# Patient Record
Sex: FEMALE | Race: WHITE | Employment: OTHER | URBAN - METROPOLITAN AREA
[De-identification: names, ages, dates, MRNs, and addresses within clinical notes are randomized per-mention and may not be internally consistent; named-entity substitution may affect disease eponyms.]

---

## 2024-01-24 ENCOUNTER — OFFICE VISIT (OUTPATIENT)
Dept: PULMONOLOGY | Facility: HOSPITAL | Age: 64
End: 2024-01-24
Payer: COMMERCIAL

## 2024-01-24 VITALS
WEIGHT: 170 LBS | BODY MASS INDEX: 31.09 KG/M2 | DIASTOLIC BLOOD PRESSURE: 66 MMHG | OXYGEN SATURATION: 97 % | HEART RATE: 71 BPM | TEMPERATURE: 96.9 F | SYSTOLIC BLOOD PRESSURE: 124 MMHG

## 2024-01-24 DIAGNOSIS — R91.1 LUNG NODULE: Primary | ICD-10-CM

## 2024-01-24 PROCEDURE — 99213 OFFICE O/P EST LOW 20 MIN: CPT | Performed by: INTERNAL MEDICINE

## 2024-01-24 PROCEDURE — 99213 OFFICE O/P EST LOW 20 MIN: CPT | Mod: GC | Performed by: INTERNAL MEDICINE

## 2024-01-24 RX ORDER — TIOTROPIUM BROMIDE AND OLODATEROL 3.124; 2.736 UG/1; UG/1
2 SPRAY, METERED RESPIRATORY (INHALATION) DAILY
Qty: 4 G | Refills: 3 | Status: SHIPPED | OUTPATIENT
Start: 2024-01-24

## 2024-01-24 SDOH — ECONOMIC STABILITY: FOOD INSECURITY: WITHIN THE PAST 12 MONTHS, YOU WORRIED THAT YOUR FOOD WOULD RUN OUT BEFORE YOU GOT MONEY TO BUY MORE.: NEVER TRUE

## 2024-01-24 SDOH — ECONOMIC STABILITY: FOOD INSECURITY: WITHIN THE PAST 12 MONTHS, THE FOOD YOU BOUGHT JUST DIDN'T LAST AND YOU DIDN'T HAVE MONEY TO GET MORE.: NEVER TRUE

## 2024-01-24 ASSESSMENT — LIFESTYLE VARIABLES
HOW MANY STANDARD DRINKS CONTAINING ALCOHOL DO YOU HAVE ON A TYPICAL DAY: PATIENT DOES NOT DRINK
SKIP TO QUESTIONS 9-10: 1
AUDIT-C TOTAL SCORE: 0
HOW OFTEN DO YOU HAVE A DRINK CONTAINING ALCOHOL: NEVER
HOW OFTEN DO YOU HAVE SIX OR MORE DRINKS ON ONE OCCASION: NEVER

## 2024-01-24 ASSESSMENT — PAIN SCALES - GENERAL: PAINLEVEL: 0-NO PAIN

## 2024-01-24 ASSESSMENT — PATIENT HEALTH QUESTIONNAIRE - PHQ9
2. FEELING DOWN, DEPRESSED OR HOPELESS: NOT AT ALL
1. LITTLE INTEREST OR PLEASURE IN DOING THINGS: NOT AT ALL
SUM OF ALL RESPONSES TO PHQ9 QUESTIONS 1 & 2: 0

## 2024-01-24 NOTE — PROGRESS NOTES
Reason For Visit  Lung Nodule    History Of Present Illness  Comfort Bliss is a 64 y.o. female with PMHx s/f COPD (moderate obstruction on PFTs) who presents to clinic to establish care.     Patient referred after recent imaging showed enlarging nodule. Patient first found to have RLL nodule in 2020, PET obtained at that time which showed minimal avidity so patient was recommended for surveillance imaging. Most recent CT Chest 01/2024 showed interval increase in size of RLL nodule, now measuring [id number] so she was referred to pulmonary clinic.     Patient also endorsing significant worsening of WATKINS, limiting her ability to perform chores around the house. Endorses chronic cough, unproductive. Denies chest pain, LE edema, orthopnea or PND. Denies fevers, chills. Currently on no daily inhalers. Using rescue inhaler 2-3x/day.     Outside PFTs 10/2020 report: moderate obstruction, mild BP response, mild restriction, DLCO low but correct with VA.     CT Chest 01/2024:  Interval increase in the superior segment of the right lower lobe nodule, now   measuring 17 x 10 x 11 mm, previously measuring 12 x 8 x 9 mm.   No new suspicious nodule by size criteria. Redemonstrated are several, punctate   centrilobular nodules primarily throughout the periphery of the right lower   lobe.   Moderate upper lobe predominant centrilobular emphysema.   Curvilinear focus of bandlike atelectasis or scar in the lingula.   No consolidation. No pleural effusion. Central airways are patent.     Echo 11/2020:  Normal LV systolic function  EF 65% without apparent wall motion abnormallities   Doppler pattern consistent with reduced diastolic LV compliance   No valvular disease or pathologic flow abnormalities identified   Normal PA pressure 20 mm Hg   No pericardial effusion     12 point ROS is negative except noted above     Past Medical History  No past medical history on file.    Surgical History  No past surgical history on file.    Family  History  No family history on file.     Social History  Social History     Tobacco Use    Smoking status: Former     Types: Cigarettes     Quit date:      Years since quittin.1   Substance Use Topics    Alcohol use: Not Currently     Comment: stopped 12 yrs ago    Drug use: Never     Allergies  Amoxicillin, Bee venom protein (honey bee), and Penicillins    Vital Signs  Blood pressure 124/66, pulse 71, temperature 36.1 °C (96.9 °F), weight 77.1 kg (170 lb), SpO2 97 %.  Oxygen Therapy  SpO2: 97 %              Physical Exam  Gen: Pleasant, no acute distress  HEENT: no gross abnormalities  CV: RRR  Lungs: CTAB  GI: soft, non tender, non distended  Ext: no LE edema  Neuro: AOx3, motor and sensory grossly intact  Psych: appears appropriate      Relevant Results: as above    Assessment/Plan   Comfort Bliss is a 64 y.o. female with PMHx s/f COPD (moderate obstruction on PFTs) who presents to clinic to follow up on enlarging RLL pulmonary nodule.     #COPD  -appears poorly controlled with increased symptoms recently  -repeat PFTs, 6MWT ordered  -start Stiolto, continue Albuterol PRN    #Enlarging RLL nodule  -given patient extensive smoking, nodule is high risk and will need investigation  -case discussed with IP, plan to schedule patient with biopsy, same day CT Chest at that time for procedural planning  -PET CT ordered    RTC in 3 months    Mariel Nur MD

## 2024-01-24 NOTE — PATIENT INSTRUCTIONS
Thanks for coming in today. Here is what we discussed:  -the fact that the nodule in your lung is growing is concerning given your smoking history and we need to investigate it  -I am ordering you a PET scan, please call to schedule  -I am ordering repeat breathing and a walking tests, please call to schedule these  -I will start you on a new inhaler called Stiolto, use daily and continue to use your rescue inhaler as needed for now  -I will refer you to a our procedure team to schedule a biopsy of this nodule once your PET CT is complete  -return to clinic in 3 months    For scheduling purposes:   Call [phone] to schedule a breathing or a walking test   Call [phone] to schedule  EKG's, Echocardiograms and Cardiopulmonary Stress Tests.   Call [phone] to schedule Radiology tests such as Nuclear Medicine Stress Tests, CT Scans, and MRI's.

## 2024-01-29 DIAGNOSIS — R91.1 LUNG NODULE: Primary | ICD-10-CM

## 2024-01-29 DIAGNOSIS — Z01.818 PRE-OP TESTING: ICD-10-CM

## 2024-01-29 NOTE — PROGRESS NOTES
Bronchoscopy Scheduling Request    Pre-bronchoscopy visit: Not needed   Please schedule procedure: Next available    Cytology on-site:  Yes  Location:  [facility]  Performing physician:  Advanced diagnostic bronchoscopist  Referring physician:  Mariel Nur MD, Richelle Esteves, APRN-CNP  Indication:  RLL nodule  Sedation / Anesthesia:  GA  Procedure:  Navigational bronchoscopy, Staging EBUS  Time:  Tier 3  Fluorscopy:   Yes  Imaging needed:  CT Iván - same day as procedure  Labs:  CBC, BMP  Meds:  None  Special Considerations:  None  Reviewed by:  Austyn Peace MD

## 2024-02-07 ENCOUNTER — HOSPITAL ENCOUNTER (OUTPATIENT)
Dept: RESPIRATORY THERAPY | Facility: HOSPITAL | Age: 64
Discharge: HOME | End: 2024-02-07
Payer: COMMERCIAL

## 2024-02-07 ENCOUNTER — APPOINTMENT (OUTPATIENT)
Dept: RADIOLOGY | Facility: HOSPITAL | Age: 64
End: 2024-02-07
Payer: COMMERCIAL

## 2024-02-07 DIAGNOSIS — R91.1 LUNG NODULE: ICD-10-CM

## 2024-02-07 PROCEDURE — 94726 PLETHYSMOGRAPHY LUNG VOLUMES: CPT | Performed by: INTERNAL MEDICINE

## 2024-02-07 PROCEDURE — 94618 PULMONARY STRESS TESTING: CPT | Performed by: INTERNAL MEDICINE

## 2024-02-07 PROCEDURE — 94726 PLETHYSMOGRAPHY LUNG VOLUMES: CPT

## 2024-02-07 PROCEDURE — 94729 DIFFUSING CAPACITY: CPT | Performed by: INTERNAL MEDICINE

## 2024-02-07 PROCEDURE — 94060 EVALUATION OF WHEEZING: CPT | Performed by: INTERNAL MEDICINE

## 2024-02-08 LAB
MGC ASCENT PFT - FEV1 - POST: 1.14
MGC ASCENT PFT - FEV1 - PRE: 1.21
MGC ASCENT PFT - FEV1 - PREDICTED: 2.14
MGC ASCENT PFT - FVC - POST: 1.55
MGC ASCENT PFT - FVC - PRE: 1.76
MGC ASCENT PFT - FVC - PREDICTED: 2.68

## 2024-02-13 ENCOUNTER — APPOINTMENT (OUTPATIENT)
Dept: RADIOLOGY | Facility: HOSPITAL | Age: 64
End: 2024-02-13
Payer: COMMERCIAL

## 2024-02-20 ENCOUNTER — APPOINTMENT (OUTPATIENT)
Dept: RADIOLOGY | Facility: HOSPITAL | Age: 64
End: 2024-02-20
Payer: COMMERCIAL

## 2024-02-22 ENCOUNTER — LAB (OUTPATIENT)
Dept: LAB | Facility: LAB | Age: 64
End: 2024-02-22
Payer: COMMERCIAL

## 2024-02-22 DIAGNOSIS — R91.1 LUNG NODULE: ICD-10-CM

## 2024-02-22 DIAGNOSIS — Z01.818 PRE-OP TESTING: ICD-10-CM

## 2024-02-22 LAB
ANION GAP SERPL CALC-SCNC: 11 MMOL/L (ref 10–20)
BUN SERPL-MCNC: 12 MG/DL (ref 6–23)
CALCIUM SERPL-MCNC: 9.1 MG/DL (ref 8.6–10.3)
CHLORIDE SERPL-SCNC: 106 MMOL/L (ref 98–107)
CO2 SERPL-SCNC: 25 MMOL/L (ref 21–32)
CREAT SERPL-MCNC: 0.69 MG/DL (ref 0.5–1.05)
EGFRCR SERPLBLD CKD-EPI 2021: >90 ML/MIN/1.73M*2
ERYTHROCYTE [DISTWIDTH] IN BLOOD BY AUTOMATED COUNT: 16.9 % (ref 11.5–14.5)
GLUCOSE SERPL-MCNC: 169 MG/DL (ref 74–99)
HCT VFR BLD AUTO: 33.7 % (ref 36–46)
HGB BLD-MCNC: 10.6 G/DL (ref 12–16)
MCH RBC QN AUTO: 28.5 PG (ref 26–34)
MCHC RBC AUTO-ENTMCNC: 31.5 G/DL (ref 32–36)
MCV RBC AUTO: 91 FL (ref 80–100)
NRBC BLD-RTO: 0 /100 WBCS (ref 0–0)
PLATELET # BLD AUTO: 122 X10*3/UL (ref 150–450)
POTASSIUM SERPL-SCNC: 4 MMOL/L (ref 3.5–5.3)
RBC # BLD AUTO: 3.72 X10*6/UL (ref 4–5.2)
SODIUM SERPL-SCNC: 138 MMOL/L (ref 136–145)
WBC # BLD AUTO: 2.7 X10*3/UL (ref 4.4–11.3)

## 2024-02-22 PROCEDURE — 36415 COLL VENOUS BLD VENIPUNCTURE: CPT

## 2024-02-22 PROCEDURE — 80048 BASIC METABOLIC PNL TOTAL CA: CPT

## 2024-02-22 PROCEDURE — 85027 COMPLETE CBC AUTOMATED: CPT

## 2024-03-07 ENCOUNTER — HOSPITAL ENCOUNTER (OUTPATIENT)
Dept: GASTROENTEROLOGY | Facility: HOSPITAL | Age: 64
Discharge: HOME | End: 2024-03-07
Payer: COMMERCIAL

## 2024-03-07 ENCOUNTER — ANESTHESIA EVENT (OUTPATIENT)
Dept: GASTROENTEROLOGY | Facility: HOSPITAL | Age: 64
End: 2024-03-07
Payer: COMMERCIAL

## 2024-03-07 ENCOUNTER — HOSPITAL ENCOUNTER (OUTPATIENT)
Dept: RADIOLOGY | Facility: HOSPITAL | Age: 64
Discharge: HOME | End: 2024-03-07
Payer: COMMERCIAL

## 2024-03-07 ENCOUNTER — ANESTHESIA (OUTPATIENT)
Dept: GASTROENTEROLOGY | Facility: HOSPITAL | Age: 64
End: 2024-03-07
Payer: COMMERCIAL

## 2024-03-07 VITALS
BODY MASS INDEX: 31.28 KG/M2 | DIASTOLIC BLOOD PRESSURE: 74 MMHG | WEIGHT: 170 LBS | TEMPERATURE: 96.8 F | HEART RATE: 81 BPM | OXYGEN SATURATION: 96 % | RESPIRATION RATE: 21 BRPM | HEIGHT: 62 IN | SYSTOLIC BLOOD PRESSURE: 130 MMHG

## 2024-03-07 DIAGNOSIS — Z01.818 PRE-OP TESTING: ICD-10-CM

## 2024-03-07 DIAGNOSIS — R91.1 LUNG NODULE: ICD-10-CM

## 2024-03-07 LAB — GLUCOSE BLD MANUAL STRIP-MCNC: 113 MG/DL (ref 74–99)

## 2024-03-07 PROCEDURE — 3700000001 HC GENERAL ANESTHESIA TIME - INITIAL BASE CHARGE

## 2024-03-07 PROCEDURE — 31627 NAVIGATIONAL BRONCHOSCOPY: CPT | Performed by: INTERNAL MEDICINE

## 2024-03-07 PROCEDURE — 31628 BRONCHOSCOPY/LUNG BX EACH: CPT | Performed by: INTERNAL MEDICINE

## 2024-03-07 PROCEDURE — 88305 TISSUE EXAM BY PATHOLOGIST: CPT | Mod: TC,SUR | Performed by: INTERNAL MEDICINE

## 2024-03-07 PROCEDURE — 88172 CYTP DX EVAL FNA 1ST EA SITE: CPT | Performed by: PATHOLOGY

## 2024-03-07 PROCEDURE — 71250 CT THORAX DX C-: CPT

## 2024-03-07 PROCEDURE — 88305 TISSUE EXAM BY PATHOLOGIST: CPT | Performed by: STUDENT IN AN ORGANIZED HEALTH CARE EDUCATION/TRAINING PROGRAM

## 2024-03-07 PROCEDURE — 7100000010 HC PHASE TWO TIME - EACH INCREMENTAL 1 MINUTE

## 2024-03-07 PROCEDURE — 88305 TISSUE EXAM BY PATHOLOGIST: CPT | Performed by: PATHOLOGY

## 2024-03-07 PROCEDURE — 31629 BRONCHOSCOPY/NEEDLE BX EACH: CPT | Performed by: INTERNAL MEDICINE

## 2024-03-07 PROCEDURE — 2500000005 HC RX 250 GENERAL PHARMACY W/O HCPCS: Mod: SE

## 2024-03-07 PROCEDURE — 88173 CYTOPATH EVAL FNA REPORT: CPT | Mod: TC,MCY | Performed by: INTERNAL MEDICINE

## 2024-03-07 PROCEDURE — 2500000002 HC RX 250 W HCPCS SELF ADMINISTERED DRUGS (ALT 637 FOR MEDICARE OP, ALT 636 FOR OP/ED): Mod: SE | Performed by: STUDENT IN AN ORGANIZED HEALTH CARE EDUCATION/TRAINING PROGRAM

## 2024-03-07 PROCEDURE — 88341 IMHCHEM/IMCYTCHM EA ADD ANTB: CPT | Performed by: STUDENT IN AN ORGANIZED HEALTH CARE EDUCATION/TRAINING PROGRAM

## 2024-03-07 PROCEDURE — 7100000009 HC PHASE TWO TIME - INITIAL BASE CHARGE

## 2024-03-07 PROCEDURE — 2500000005 HC RX 250 GENERAL PHARMACY W/O HCPCS: Mod: SE | Performed by: STUDENT IN AN ORGANIZED HEALTH CARE EDUCATION/TRAINING PROGRAM

## 2024-03-07 PROCEDURE — 3700000002 HC GENERAL ANESTHESIA TIME - EACH INCREMENTAL 1 MINUTE

## 2024-03-07 PROCEDURE — 71250 CT THORAX DX C-: CPT | Performed by: RADIOLOGY

## 2024-03-07 PROCEDURE — 2500000004 HC RX 250 GENERAL PHARMACY W/ HCPCS (ALT 636 FOR OP/ED): Mod: SE

## 2024-03-07 PROCEDURE — 31654 BRONCH EBUS IVNTJ PERPH LES: CPT | Performed by: INTERNAL MEDICINE

## 2024-03-07 PROCEDURE — A31654 PR BRNSCHSC TNDSC EBUS DX/TX INTERVENTION PERPH LES: Performed by: STUDENT IN AN ORGANIZED HEALTH CARE EDUCATION/TRAINING PROGRAM

## 2024-03-07 PROCEDURE — 88342 IMHCHEM/IMCYTCHM 1ST ANTB: CPT | Performed by: STUDENT IN AN ORGANIZED HEALTH CARE EDUCATION/TRAINING PROGRAM

## 2024-03-07 PROCEDURE — 88173 CYTOPATH EVAL FNA REPORT: CPT | Performed by: PATHOLOGY

## 2024-03-07 PROCEDURE — 2720000007 HC OR 272 NO HCPCS

## 2024-03-07 PROCEDURE — 7100000002 HC RECOVERY ROOM TIME - EACH INCREMENTAL 1 MINUTE

## 2024-03-07 PROCEDURE — 7100000001 HC RECOVERY ROOM TIME - INITIAL BASE CHARGE

## 2024-03-07 PROCEDURE — 82947 ASSAY GLUCOSE BLOOD QUANT: CPT

## 2024-03-07 PROCEDURE — A31654 PR BRNSCHSC TNDSC EBUS DX/TX INTERVENTION PERPH LES

## 2024-03-07 RX ORDER — HYDROMORPHONE HYDROCHLORIDE 1 MG/ML
0.2 INJECTION, SOLUTION INTRAMUSCULAR; INTRAVENOUS; SUBCUTANEOUS EVERY 5 MIN PRN
Status: CANCELLED | OUTPATIENT
Start: 2024-03-07

## 2024-03-07 RX ORDER — ONDANSETRON HYDROCHLORIDE 2 MG/ML
4 INJECTION, SOLUTION INTRAVENOUS ONCE AS NEEDED
Status: CANCELLED | OUTPATIENT
Start: 2024-03-07

## 2024-03-07 RX ORDER — OXYCODONE HYDROCHLORIDE 5 MG/1
2.5 TABLET ORAL EVERY 4 HOURS PRN
Status: CANCELLED | OUTPATIENT
Start: 2024-03-07

## 2024-03-07 RX ORDER — PROPOFOL 10 MG/ML
INJECTION, EMULSION INTRAVENOUS AS NEEDED
Status: DISCONTINUED | OUTPATIENT
Start: 2024-03-07 | End: 2024-03-07

## 2024-03-07 RX ORDER — ESMOLOL HYDROCHLORIDE 10 MG/ML
INJECTION INTRAVENOUS AS NEEDED
Status: DISCONTINUED | OUTPATIENT
Start: 2024-03-07 | End: 2024-03-07

## 2024-03-07 RX ORDER — METOCLOPRAMIDE HYDROCHLORIDE 5 MG/ML
10 INJECTION INTRAMUSCULAR; INTRAVENOUS ONCE AS NEEDED
Status: CANCELLED | OUTPATIENT
Start: 2024-03-07

## 2024-03-07 RX ORDER — ACETAMINOPHEN 325 MG/1
650 TABLET ORAL EVERY 4 HOURS PRN
Status: CANCELLED | OUTPATIENT
Start: 2024-03-07

## 2024-03-07 RX ORDER — ROCURONIUM BROMIDE 10 MG/ML
INJECTION, SOLUTION INTRAVENOUS AS NEEDED
Status: DISCONTINUED | OUTPATIENT
Start: 2024-03-07 | End: 2024-03-07

## 2024-03-07 RX ORDER — LABETALOL HYDROCHLORIDE 5 MG/ML
5 INJECTION, SOLUTION INTRAVENOUS ONCE AS NEEDED
Status: CANCELLED | OUTPATIENT
Start: 2024-03-07

## 2024-03-07 RX ORDER — DEXAMETHASONE SODIUM PHOSPHATE 4 MG/ML
INJECTION, SOLUTION INTRA-ARTICULAR; INTRALESIONAL; INTRAMUSCULAR; INTRAVENOUS; SOFT TISSUE AS NEEDED
Status: DISCONTINUED | OUTPATIENT
Start: 2024-03-07 | End: 2024-03-07

## 2024-03-07 RX ORDER — PROPOFOL 10 MG/ML
INJECTION, EMULSION INTRAVENOUS CONTINUOUS PRN
Status: DISCONTINUED | OUTPATIENT
Start: 2024-03-07 | End: 2024-03-07

## 2024-03-07 RX ORDER — ALBUTEROL SULFATE 0.83 MG/ML
2.5 SOLUTION RESPIRATORY (INHALATION) ONCE AS NEEDED
Status: CANCELLED | OUTPATIENT
Start: 2024-03-07

## 2024-03-07 RX ORDER — SODIUM CHLORIDE, SODIUM LACTATE, POTASSIUM CHLORIDE, CALCIUM CHLORIDE 600; 310; 30; 20 MG/100ML; MG/100ML; MG/100ML; MG/100ML
INJECTION, SOLUTION INTRAVENOUS CONTINUOUS PRN
Status: DISCONTINUED | OUTPATIENT
Start: 2024-03-07 | End: 2024-03-07

## 2024-03-07 RX ORDER — ONDANSETRON HYDROCHLORIDE 2 MG/ML
INJECTION, SOLUTION INTRAVENOUS AS NEEDED
Status: DISCONTINUED | OUTPATIENT
Start: 2024-03-07 | End: 2024-03-07

## 2024-03-07 RX ORDER — OXYCODONE HYDROCHLORIDE 5 MG/1
5 TABLET ORAL EVERY 4 HOURS PRN
Status: CANCELLED | OUTPATIENT
Start: 2024-03-07

## 2024-03-07 RX ORDER — LIDOCAINE HYDROCHLORIDE 10 MG/ML
0.1 INJECTION INFILTRATION; PERINEURAL ONCE
Status: CANCELLED | OUTPATIENT
Start: 2024-03-07 | End: 2024-03-07

## 2024-03-07 RX ORDER — MIDAZOLAM HYDROCHLORIDE 1 MG/ML
INJECTION INTRAMUSCULAR; INTRAVENOUS AS NEEDED
Status: DISCONTINUED | OUTPATIENT
Start: 2024-03-07 | End: 2024-03-07

## 2024-03-07 RX ORDER — ALBUTEROL SULFATE 0.83 MG/ML
2.5 SOLUTION RESPIRATORY (INHALATION) EVERY 6 HOURS PRN
Status: DISCONTINUED | OUTPATIENT
Start: 2024-03-07 | End: 2024-03-08 | Stop reason: HOSPADM

## 2024-03-07 RX ORDER — HYDRALAZINE HYDROCHLORIDE 20 MG/ML
5 INJECTION INTRAMUSCULAR; INTRAVENOUS EVERY 30 MIN PRN
Status: CANCELLED | OUTPATIENT
Start: 2024-03-07

## 2024-03-07 RX ORDER — SODIUM CHLORIDE, SODIUM LACTATE, POTASSIUM CHLORIDE, CALCIUM CHLORIDE 600; 310; 30; 20 MG/100ML; MG/100ML; MG/100ML; MG/100ML
100 INJECTION, SOLUTION INTRAVENOUS CONTINUOUS
Status: CANCELLED | OUTPATIENT
Start: 2024-03-07

## 2024-03-07 RX ORDER — LIDOCAINE HCL/PF 100 MG/5ML
SYRINGE (ML) INTRAVENOUS AS NEEDED
Status: DISCONTINUED | OUTPATIENT
Start: 2024-03-07 | End: 2024-03-07

## 2024-03-07 RX ADMIN — ESMOLOL HYDROCHLORIDE 40 MG: 10 INJECTION, SOLUTION INTRAVENOUS at 12:42

## 2024-03-07 RX ADMIN — SODIUM CHLORIDE, POTASSIUM CHLORIDE, SODIUM LACTATE AND CALCIUM CHLORIDE: 600; 310; 30; 20 INJECTION, SOLUTION INTRAVENOUS at 11:18

## 2024-03-07 RX ADMIN — ROCURONIUM BROMIDE 60 MG: 10 INJECTION INTRAVENOUS at 11:20

## 2024-03-07 RX ADMIN — LIDOCAINE HYDROCHLORIDE 70 MG: 20 INJECTION INTRAVENOUS at 11:20

## 2024-03-07 RX ADMIN — ONDANSETRON 4 MG: 2 INJECTION INTRAMUSCULAR; INTRAVENOUS at 12:48

## 2024-03-07 RX ADMIN — MIDAZOLAM HYDROCHLORIDE 2 MG: 1 INJECTION, SOLUTION INTRAMUSCULAR; INTRAVENOUS at 11:18

## 2024-03-07 RX ADMIN — ALBUTEROL SULFATE 2.5 MG: 2.5 SOLUTION RESPIRATORY (INHALATION) at 09:27

## 2024-03-07 RX ADMIN — ESMOLOL HYDROCHLORIDE 20 MG: 10 INJECTION, SOLUTION INTRAVENOUS at 12:10

## 2024-03-07 RX ADMIN — DEXAMETHASONE SODIUM PHOSPHATE 10 MG: 4 INJECTION, SOLUTION INTRA-ARTICULAR; INTRALESIONAL; INTRAMUSCULAR; INTRAVENOUS; SOFT TISSUE at 11:25

## 2024-03-07 RX ADMIN — PROPOFOL 200 MG: 10 INJECTION, EMULSION INTRAVENOUS at 11:20

## 2024-03-07 RX ADMIN — ROCURONIUM BROMIDE 20 MG: 10 INJECTION INTRAVENOUS at 12:19

## 2024-03-07 RX ADMIN — PROPOFOL 100 MCG/KG/MIN: 10 INJECTION, EMULSION INTRAVENOUS at 11:26

## 2024-03-07 SDOH — HEALTH STABILITY: MENTAL HEALTH: CURRENT SMOKER: 0

## 2024-03-07 ASSESSMENT — PAIN - FUNCTIONAL ASSESSMENT
PAIN_FUNCTIONAL_ASSESSMENT: 0-10
PAIN_FUNCTIONAL_ASSESSMENT: 0-10

## 2024-03-07 ASSESSMENT — PAIN SCALES - GENERAL
PAINLEVEL_OUTOF10: 0 - NO PAIN

## 2024-03-07 ASSESSMENT — COLUMBIA-SUICIDE SEVERITY RATING SCALE - C-SSRS
1. IN THE PAST MONTH, HAVE YOU WISHED YOU WERE DEAD OR WISHED YOU COULD GO TO SLEEP AND NOT WAKE UP?: NO
2. HAVE YOU ACTUALLY HAD ANY THOUGHTS OF KILLING YOURSELF?: NO
6. HAVE YOU EVER DONE ANYTHING, STARTED TO DO ANYTHING, OR PREPARED TO DO ANYTHING TO END YOUR LIFE?: NO

## 2024-03-07 NOTE — DISCHARGE INSTRUCTIONS
The anesthetics, sedatives or narcotics which were given to you today will be acting in your body for the next 24 hours, so you might feel a little sleepy or groggy. This feeling should slowly wear off.   Carefully read and follow the instructions below:   You received sedation today.   Do not drive or operate machinery or power tools of any kind.   No alcoholic beverages today, not even beer or wine.   No over the counter medications that contain alcohol or may cause drowsiness.   Do not make important decisions or sign legal documents.     Do not use Aspirin containing products or non-steroidal medications for the next 24 hours.  (Examples of these types of medications include: Advil, Aleve, Ecotrin, Ibuprofen, Motrin or Naprosyn.  This list is not all-inclusive.  Check with your physician or pharmacist before resuming these medications.   Tylenol, cough medicine, cough drops or throat lozenges may be used when you are allowed to resume eating and drinking.     Call your physician if any of these symptoms occur:   High fever over 101 degrees or chills (a low grade fever is common for 24 hours)   Rash or hives   Persistent nausea or vomiting   Inability to urinate within 8 hours after the procedure    Go directly to the emergency room if you notice any of the following:   Shortness of breath   Chest pain              Coughing up large amounts of bright red blood greater than a teaspoonful of blood clots (about a teaspoonful for the next 24-48 hours is normal, especially if you had a biopsy)    Resume all normal medications unless directed otherwise by your doctor.     Your doctor recommends these additional instructions:    Follow up with your referring physician as previously scheduled.    If you experience any problems or have any questions following discharge, please call:   Before 5 pm: [phone]   After 5pm and on weekends: [phone] / [phone] and ask for the Pulmonary Fellow on-call (Pager  Number: [phone])

## 2024-03-07 NOTE — ANESTHESIA POSTPROCEDURE EVALUATION
Patient: Comfort Bliss    Procedure Summary       Date: 03/07/24 Room / Location: [facility]    Anesthesia Start: 1113 Anesthesia Stop: 1308    Procedure: BRONCHOSCOPY Diagnosis: Lung nodule    Scheduled Providers: Mukul Najera MD; Sara Watson RN; Shiraz Aguirre MD Responsible Provider: JOSETTE Nicole    Anesthesia Type: general ASA Status: 3            Anesthesia Type: general    Vitals Value Taken Time   /92 03/07/24 1333   Temp 36 °C (96.8 °F) 03/07/24 1306   Pulse 80 03/07/24 1333   Resp 21 03/07/24 1333   SpO2 95 % 03/07/24 1333       Anesthesia Post Evaluation    Patient location during evaluation: PACU  Patient participation: complete - patient participated  Level of consciousness: awake and alert  Pain management: adequate  Airway patency: patent  Cardiovascular status: acceptable  Respiratory status: acceptable  Hydration status: acceptable  Postoperative Nausea and Vomiting: none        There were no known notable events for this encounter.

## 2024-03-07 NOTE — H&P
"History Of Present Illness  Comfort Bliss is a 64 y.o. female presenting with imaging findings showing a right lower lobe nodule. She has a dry cough and dyspnea on exertion. Co-morbidities as below.     Past Medical History  Past Medical History:   Diagnosis Date    COPD (chronic obstructive pulmonary disease) (CMS/[facility])        Surgical History  History reviewed. No pertinent surgical history.     Social History  She reports that she quit smoking about 4 years ago. Her smoking use included cigarettes. She does not have any smokeless tobacco history on file. She reports that she does not currently use alcohol. She reports that she does not use drugs.    Family History  No family history on file.     Allergies  Amoxicillin, Bee venom protein (honey bee), and Penicillins    Review of Systems   As above  Physical Exam  HENT:      Head: Normocephalic and atraumatic.      Nose: No rhinorrhea.   Eyes:      General:         Right eye: No discharge.         Left eye: No discharge.   Cardiovascular:      Rate and Rhythm: Normal rate.   Pulmonary:      Effort: Pulmonary effort is normal.   Skin:     Coloration: Skin is not jaundiced.   Neurological:      Mental Status: She is alert and oriented to person, place, and time.   Psychiatric:         Mood and Affect: Mood normal.          Last Recorded Vitals  Blood pressure 142/82, pulse 83, temperature 36.1 °C (97 °F), temperature source Temporal, resp. rate 20, height 1.575 m (5' 2\"), weight 77.1 kg (170 lb), SpO2 97 %.    Relevant Results             Assessment/Plan   Active Problems:  There are no active Hospital Problems.      Right lower lobe nodule - proceed with diagnostic bronchoscopy.       I spent 10 minutes in the professional and overall care of this patient.      Mukul Najera MD  Staff Physician - Interventional Pulmonology  11:09 AM  03/07/24      "

## 2024-03-07 NOTE — ANESTHESIA PREPROCEDURE EVALUATION
"Patient: Comfort Bliss    Procedure Information       Date/Time: 24 1030    Scheduled providers: Mukul Najera MD; Sara Watson RN; Shiraz Aguirre MD    Procedure: BRONCHOSCOPY    Location: [facility]          64 yoF h/o asthma, COPD, T2DM, GERD, rheumatic fever, HLD, hypothyroidism, panic attacks presenting for bronchoscopy after RLL nodule found on CT chest and staging EBUS.     ALLERGIES:  Allergies   Allergen Reactions    Amoxicillin Shortness of breath and Dizziness    Bee Venom Protein (Honey Bee) Swelling    Penicillins Unknown        MEDICAL HISTORY:  No past medical history on file.     Relevant Problems   No relevant active problems        SURGICAL HISTORY:  No past surgical history on file.     VITALS:      2024     1:59 PM 3/3/2021     4:05 PM   Vitals   Systolic 124 139   Diastolic 66 78   Heart Rate 71 81   Temp 36.1 °C (96.9 °F) 36.3 °C (97.3 °F)   Resp  16   Height (in)  1.575 m (5' 2\")   Weight (lb) 170 189.25   BMI 31.09 kg/m2 34.61 kg/m2   BSA (m2) 1.84 m2 1.94 m2   Visit Report Report        LABS:   BMP   Lab Results   Component Value Date    GLUCOSE 169 (H) 2024    CALCIUM 9.1 2024     2024    K 4.0 2024    CO2 25 2024     2024    BUN 12 2024    CREATININE 0.69 2024   , CBC  Lab Results   Component Value Date    WBC 2.7 (L) 2024    HGB 10.6 (L) 2024    HCT 33.7 (L) 2024    MCV 91 2024     (L) 2024      SOCIAL:  Social History     Tobacco Use   Smoking Status Former    Types: Cigarettes    Quit date: 2020    Years since quittin.1   Smokeless Tobacco Not on file      Social History     Substance and Sexual Activity   Alcohol Use Not Currently    Comment: stopped 12 yrs ago      Social History     Substance and Sexual Activity   Drug Use Never        NPO STATUS:  No data recorded    Clinical Areas Reviewed:    Allergies              Physical " Exam    Airway  Mallampati: IV  TM distance: <3 FB  Neck ROM: full     Cardiovascular   Rhythm: regular  Rate: normal     Dental   Comments: edentulous   Pulmonary   Comments: Non labored resp   Abdominal            Anesthesia Plan    History of general anesthesia?: yes  History of complications of general anesthesia?: no    ASA 3     general     The patient is not a current smoker.    intravenous induction   Postoperative administration of opioids is intended.  Trial extubation is planned.  Anesthetic plan and risks discussed with patient.  Use of blood products discussed with patient who consented to blood products.    Plan discussed with CRNA and CAA.

## 2024-03-07 NOTE — ANESTHESIA PROCEDURE NOTES
Airway  Date/Time: 3/7/2024 11:22 AM  Urgency: elective    Airway not difficult    Staffing  Performed: CRNA   Authorized by: Shiraz Aguirre MD    Performed by: MARCIAL Nicole-YUE  Patient location during procedure: OR    Indications and Patient Condition  Indications for airway management: anesthesia and airway protection  Spontaneous ventilation: present  Sedation level: no sedation  Preoxygenated: yes  Patient position: sniffing  MILS maintained throughout  Mask difficulty assessment: 1 - vent by mask    Final Airway Details  Final airway type: endotracheal airway      Successful airway: ETT  Cuffed: yes   Successful intubation technique: video laryngoscopy  Facilitating devices/methods: intubating stylet  Blade: Mely  Blade size: #4  ETT size (mm): 8.5  Cormack-Lehane Classification: grade IIa - partial view of glottis  Placement verified by: chest auscultation and capnometry   Measured from: lips  ETT to lips (cm): 19  Number of attempts at approach: 1

## 2024-03-08 LAB
LABORATORY COMMENT REPORT: NORMAL
LABORATORY COMMENT REPORT: NORMAL
PATH REPORT.FINAL DX SPEC: NORMAL
PATH REPORT.GROSS SPEC: NORMAL
PATH REPORT.INTRAOP OBS SPEC DOC: NORMAL
PATH REPORT.TOTAL CANCER: NORMAL

## 2024-03-12 LAB
LAB AP ASR DISCLAIMER: NORMAL
LABORATORY COMMENT REPORT: NORMAL
PATH REPORT.COMMENTS IMP SPEC: NORMAL
PATH REPORT.FINAL DX SPEC: NORMAL
PATH REPORT.GROSS SPEC: NORMAL
PATH REPORT.TOTAL CANCER: NORMAL
RESIDENT REVIEW: NORMAL

## 2024-03-13 ENCOUNTER — TELEPHONE (OUTPATIENT)
Dept: PULMONOLOGY | Facility: CLINIC | Age: 64
End: 2024-03-13
Payer: COMMERCIAL

## 2024-03-13 DIAGNOSIS — D3A.090 CARCINOID TUMOR DETERMINED BY BIOPSY OF LUNG (CMS-HCC): Primary | ICD-10-CM

## 2024-03-13 NOTE — TELEPHONE ENCOUNTER
Result Communication    Resulted Orders   Cytology Consultation (Non-Gynecologic)   Result Value Ref Range    Case Report       Non-gynecologic Cytology                          Case: [id number]                                   Authorizing Provider:  Mukul Najera MD     Collected:           03/07/2024 1158              Ordering Location:     [facility]       Received:            03/07/2024 1642                                     Center                                                                       Pathologist:           Pili Han MD                                                          Specimen:    LUNG FINE NEEDLE ASPIRATION RIGHT LOWER LOBE, RLL nodule                                   Final Cytological Interpretation       A. LUNG, FINE NEEDLE ASPIRATION, RIGHT LOWER LOBE, CYTOLOGY AND CELL BLOCK:  --  Non diagnostic.  Specimen consists of rare benign bronchial cells.  --  Correlate with concurrent biopsy surgical pathology report ([id number]).               Slide(s) initially screened by AYSHA Guerrero at [address] [id number]  By the signature on this report, the individual or group listed as making the Final Interpretation/Diagnosis certifies that they have reviewed this case.       Rapid Onsite Evaluation       A. LUNG FINE NEEDLE ASPIRATION RIGHT LOWER LOBE.  Rapid On-site Evaluation Read Result:  Pass 1:  Rare bronchial cells present.    Pathologist: Dr. Yolande Hallman  Called Date/Time: 3/7/2024 12:50 PM    Evaluation performed at:   [facility]  Department of Pathology  [address] [place] [id number]  Phone: [phone] Fax: [phone]        Specimen Description       A. LUNG FINE NEEDLE ASPIRATION RIGHT LOWER LOBE.  Received 6 direct smears (3 air-dried Diff-Quik and 3 spray-fixed) and 48 ml red opaque needle rinse in Cytolyt with particles.       Specimen  Processing Detail       A1 Slides Only (No Block)   A1-1 Diff Quik Stain Smear NGYN    A1-2 Pap Stain Smear NGYN    A1-3 Diff Quik Stain Smear NGYN    A1-4 Pap Stain Smear NGYN    A1-5 Diff Quik Stain Smear NGYN    A1-6 Pap Stain Smear NGYN    A2 Cell Block   A2-1 H&E      Surgical Pathology Exam   Result Value Ref Range    Case Report       Surgical Pathology                                Case: [id number]                                  Authorizing Provider:  Mukul Najera MD     Collected:           03/07/2024 1226              Ordering Location:     [facility]       Received:            03/07/2024 1901                                     Center                                                                       Pathologist:           Faustino Egan MD                                                              Specimen:    TRANSBRONCHIAL BIOPSY, RLL nodule TBBX cryoprobe                                           FINAL DIAGNOSIS       A. LUNG, RIGHT LOWER LOBE; BIOPSY:    -- Carcinoid tumor. See comment              By the signature on this report, the individual or group listed as making the Final Interpretation/Diagnosis certifies that they have reviewed this case.       Comment       No increased mitoses or necrosis seen in this material. If this sample is representative of the entire tumor, these findings would be consistent with typical carcinoid. However, final distinction between typical and atypical carcinoid tumor requires a resection specimen.     The tumor cells are positive for synaptophysin, chromogranin, INSM1, TTF1 immunostain, while they are negative for p40 and CK AE1/AE3. Ki67 proliferative index is low (2%), and Rb immunostain shows retained nuclear expression.       Resident Review       The gross and/or microscopic findings were reviewed in conjunction with pathology resident, Victoria Chinchilla MD.       Gross Description       A: Received in formalin, labeled with the  patient´s name and hospital number, are multiple minute pink-white, soft tissue fragments aggregating to 0.3 x 0.3 x 0.1 cm.  The specimen is submitted in toto in one cassette.  [facility]      Disclaimer       One or more of the reagents used to perform assays on this specimen MAY have contained components considered to be analyte specific reagents (ASR's).  ASR's have not been cleared or approved by the U.S. Food and Drug Administration.  These assays were developed and their performance characteristics determined by the Department of Pathology at [facility]. The FDA does not require this test to go through premarket FDA review. This test is used for clinical purposes. It should not be regarded as investigational or for research. This laboratory is certified under the Clinical Laboratory Improvement Amendments (CLIA) as qualified to perform high complexity clinical laboratory testing.  The assays were performed with appropriate positive and negative controls which stained appropriately.         12:57 PM      Results were successfully communicated with the patient and they acknowledged their understanding.    Referral to thoracic surgery for consideration of resection

## 2024-04-01 PROBLEM — N39.0 ACUTE UTI: Status: ACTIVE | Noted: 2020-07-28

## 2024-04-01 PROBLEM — N89.8 VAGINAL IRRITATION: Status: ACTIVE | Noted: 2021-01-13

## 2024-04-01 PROBLEM — E11.9 DIABETES (MULTI): Status: ACTIVE | Noted: 2020-09-15

## 2024-04-01 PROBLEM — J43.2 CENTRILOBULAR EMPHYSEMA (MULTI): Status: ACTIVE | Noted: 2020-12-02

## 2024-04-01 PROBLEM — E66.811 OBESITY (BMI 30.0-34.9): Status: ACTIVE | Noted: 2020-09-15

## 2024-04-01 PROBLEM — E66.9 OBESITY (BMI 30.0-34.9): Status: ACTIVE | Noted: 2020-09-15

## 2024-04-01 PROBLEM — U07.1 COVID-19: Status: ACTIVE | Noted: 2021-09-15

## 2024-04-01 PROBLEM — R94.8 ABNORMAL GASTROINTESTINAL PET SCAN: Status: ACTIVE | Noted: 2020-11-23

## 2024-04-01 PROBLEM — R91.1 SOLITARY PULMONARY NODULE: Status: ACTIVE | Noted: 2024-01-29

## 2024-04-01 PROBLEM — S80.10XA: Status: ACTIVE | Noted: 2020-12-21

## 2024-04-01 PROBLEM — R91.8 OTHER NONSPECIFIC ABNORMAL FINDING OF LUNG FIELD: Status: ACTIVE | Noted: 2021-03-03

## 2024-04-01 PROBLEM — K21.9 GASTROESOPHAGEAL REFLUX DISEASE WITHOUT ESOPHAGITIS: Status: ACTIVE | Noted: 2020-07-28

## 2024-04-01 PROBLEM — R91.8 ABNORMAL FINDINGS ON DIAGNOSTIC IMAGING OF LUNG: Status: ACTIVE | Noted: 2024-04-01

## 2024-04-01 PROBLEM — J44.9 COPD (CHRONIC OBSTRUCTIVE PULMONARY DISEASE) (MULTI): Status: ACTIVE | Noted: 2020-12-02

## 2024-04-01 PROBLEM — R91.1 LUNG NODULE: Status: ACTIVE | Noted: 2020-12-02

## 2024-04-01 PROBLEM — R91.8 ABNORMAL CT SCAN OF LUNG: Status: ACTIVE | Noted: 2024-04-01

## 2024-04-01 PROBLEM — R42 VERTIGO: Status: ACTIVE | Noted: 2022-12-20

## 2024-04-01 PROBLEM — R35.89 OTHER POLYURIA: Status: ACTIVE | Noted: 2023-12-26

## 2024-04-01 PROBLEM — I10 ESSENTIAL (PRIMARY) HYPERTENSION: Status: ACTIVE | Noted: 2023-12-26

## 2024-04-01 PROBLEM — N64.9 BREAST LESION: Status: ACTIVE | Noted: 2021-07-23

## 2024-04-01 RX ORDER — OXYBUTYNIN CHLORIDE 10 MG/1
10 TABLET, EXTENDED RELEASE ORAL DAILY
COMMUNITY
Start: 2024-03-13

## 2024-04-01 RX ORDER — LEVOTHYROXINE SODIUM 25 UG/1
25 TABLET ORAL DAILY
COMMUNITY

## 2024-04-01 RX ORDER — MELOXICAM 100 %
POWDER (GRAM) MISCELLANEOUS
COMMUNITY
Start: 2024-01-17 | End: 2024-04-10 | Stop reason: ALTCHOICE

## 2024-04-01 RX ORDER — AMANTADINE HCL
POWDER (GRAM) MISCELLANEOUS
COMMUNITY
Start: 2023-06-23 | End: 2024-04-29 | Stop reason: WASHOUT

## 2024-04-01 RX ORDER — METFORMIN HYDROCHLORIDE 500 MG/1
1000 TABLET, EXTENDED RELEASE ORAL EVERY MORNING
COMMUNITY
Start: 2022-10-16

## 2024-04-01 RX ORDER — CELECOXIB 400 MG/1
400 CAPSULE ORAL DAILY
Status: ON HOLD | COMMUNITY
Start: 2023-12-14 | End: 2024-05-03 | Stop reason: WASHOUT

## 2024-04-01 RX ORDER — CHOLECALCIFEROL (VITAMIN D3) 125 MCG
125 CAPSULE ORAL DAILY
COMMUNITY

## 2024-04-01 RX ORDER — BLOOD SUGAR DIAGNOSTIC
STRIP MISCELLANEOUS
COMMUNITY
Start: 2024-01-03

## 2024-04-01 RX ORDER — OMEPRAZOLE 20 MG/1
20 CAPSULE, DELAYED RELEASE ORAL 2 TIMES DAILY
COMMUNITY

## 2024-04-01 RX ORDER — LISINOPRIL 5 MG/1
5 TABLET ORAL
COMMUNITY
Start: 2024-01-03

## 2024-04-01 RX ORDER — SITAGLIPTIN 100 MG/1
100 TABLET, FILM COATED ORAL DAILY
COMMUNITY

## 2024-04-01 RX ORDER — ALBUTEROL SULFATE 90 UG/1
2 AEROSOL, METERED RESPIRATORY (INHALATION) EVERY 4 HOURS PRN
COMMUNITY
Start: 2021-03-03 | End: 2024-04-02 | Stop reason: WASHOUT

## 2024-04-01 RX ORDER — LANCETS 33 GAUGE
EACH MISCELLANEOUS
COMMUNITY
Start: 2024-01-02

## 2024-04-01 RX ORDER — LANCETS 30 GAUGE
EACH MISCELLANEOUS
COMMUNITY
Start: 2024-01-03

## 2024-04-01 NOTE — PROGRESS NOTES
Subjective   Comfort Bliss  is a 64 y.o. female who presents for evaluation of newly diagnosed right lower lobe carcinoid cancer.    This patient received radiographic imaging in the setting of COPD.  This identified a pulmonary nodule.  January 2024, a right lower lobe nodule grew from 12 x 9 mm to 17 x 10 mm.  She was referred for bronchoscopy procedure which was performed on 3/7/2024 and the right lower lobe biopsy was consistent with carcinoid tumor (Ki-67 proliferative index is low 2%)    Currently the patient is in their usual state of health. She denies the following symptoms: chest pain, shortness of breath at rest, shortness of breath with activity, cough, hemoptysis, and fevers.      Medical history is notable for no history of MI, CVA or other major cardiovascular disease. She has no history of prior chest surgery.     She  reports that she quit smoking about 4 years ago. Her smoking use included cigarettes. She does not have any smokeless tobacco history on file. She reports that she does not currently use alcohol. She reports that she does not use drugs.    Objective   Physical Exam  The patient is well-appearing and in no acute distress. The trachea is midline and there is no crepitus. The lungs were clear to auscultation grossly. There was good effort and excursion. The heart had a regular rate and rhythm. The abdomen was soft, nontender and nondistended. The extremities had no edema or gross deformities. Mood and affect are appropriate.  Diagnostic Studies  I have reviewed a pathology result which shows carcinoid tumor  I reviewed pulmonary function test dated 2/7/2024 which show an FEV1 of 56% predicted and DLCO of 73% predicted  === 03/07/24 ===    CT CHEST WITHOUT FOR [facility] PLANNING    - Impression -  1. No evidence of acute pathology.  2. Indeterminate slightly lobulated nodule at the superior segment of  the right lower lobe, unchanged in size compared to 01/04/2024 exam,  however increased  in size compared to 09/29/2021. Malignancy can not  be completely excluded. Consider correlation with tissue sampling.  3. Several additional stable pulmonary nodules, with representative  example as above.  4. Diffuse hypoattenuation of visualized liver, suggestive of  steatosis. Correlate with liver function tests.  5. Splenomegaly, partially imaged.        PET/CT is ordered  Assessment/Plan   Overall, I believe that the patient  has a new diagnosis of localized carcinoid tumor .     This patient appears to have a localized tumor in the superior segment of the right lower lobe.  I believe the ideal treatment would be surgical resection with a superior segmental resection.  We discussed the surgery in detail. I carefully described the risks, benefits, and alternatives to surgical intervention. We discussed the possibility of serious complications including death. I answered any questions they expressed. After this, the patient agreed to proceed with surgery    I recommend PET CT and surgery.  Robotic right lower lobe superior segmental resection. (The PET was denied and may not be re-ordered?) Patient prefers that surgery be at [facility].      I discussed this in detail with the patient, including a discussion of alternatives. They were comfortable with this approach.     Sameer Tate MD  [phone]

## 2024-04-01 NOTE — H&P (VIEW-ONLY)
Subjective   Comfort Bliss  is a 64 y.o. female who presents for evaluation of newly diagnosed right lower lobe carcinoid cancer.    This patient received radiographic imaging in the setting of COPD.  This identified a pulmonary nodule.  January 2024, a right lower lobe nodule grew from 12 x 9 mm to 17 x 10 mm.  She was referred for bronchoscopy procedure which was performed on 3/7/2024 and the right lower lobe biopsy was consistent with carcinoid tumor (Ki-67 proliferative index is low 2%)    Currently the patient is in their usual state of health. She denies the following symptoms: chest pain, shortness of breath at rest, shortness of breath with activity, cough, hemoptysis, and fevers.      Medical history is notable for no history of MI, CVA or other major cardiovascular disease. She has no history of prior chest surgery.     She  reports that she quit smoking about 4 years ago. Her smoking use included cigarettes. She does not have any smokeless tobacco history on file. She reports that she does not currently use alcohol. She reports that she does not use drugs.    Objective   Physical Exam  The patient is well-appearing and in no acute distress. The trachea is midline and there is no crepitus. The lungs were clear to auscultation grossly. There was good effort and excursion. The heart had a regular rate and rhythm. The abdomen was soft, nontender and nondistended. The extremities had no edema or gross deformities. Mood and affect are appropriate.  Diagnostic Studies  I have reviewed a pathology result which shows carcinoid tumor  I reviewed pulmonary function test dated 2/7/2024 which show an FEV1 of 56% predicted and DLCO of 73% predicted  === 03/07/24 ===    CT CHEST WITHOUT FOR [facility] PLANNING    - Impression -  1. No evidence of acute pathology.  2. Indeterminate slightly lobulated nodule at the superior segment of  the right lower lobe, unchanged in size compared to 01/04/2024 exam,  however increased  in size compared to 09/29/2021. Malignancy can not  be completely excluded. Consider correlation with tissue sampling.  3. Several additional stable pulmonary nodules, with representative  example as above.  4. Diffuse hypoattenuation of visualized liver, suggestive of  steatosis. Correlate with liver function tests.  5. Splenomegaly, partially imaged.        PET/CT is ordered  Assessment/Plan   Overall, I believe that the patient  has a new diagnosis of localized carcinoid tumor .     This patient appears to have a localized tumor in the superior segment of the right lower lobe.  I believe the ideal treatment would be surgical resection with a superior segmental resection.  We discussed the surgery in detail. I carefully described the risks, benefits, and alternatives to surgical intervention. We discussed the possibility of serious complications including death. I answered any questions they expressed. After this, the patient agreed to proceed with surgery    I recommend PET CT and surgery.  Robotic right lower lobe superior segmental resection. (The PET was denied and may not be re-ordered?) Patient prefers that surgery be at [facility].      I discussed this in detail with the patient, including a discussion of alternatives. They were comfortable with this approach.     Sameer Tate MD  [phone]

## 2024-04-10 ENCOUNTER — OFFICE VISIT (OUTPATIENT)
Dept: SURGERY | Facility: CLINIC | Age: 64
End: 2024-04-10
Payer: COMMERCIAL

## 2024-04-10 VITALS
WEIGHT: 169.4 LBS | DIASTOLIC BLOOD PRESSURE: 79 MMHG | OXYGEN SATURATION: 98 % | HEIGHT: 62 IN | HEART RATE: 76 BPM | TEMPERATURE: 98.3 F | SYSTOLIC BLOOD PRESSURE: 129 MMHG | BODY MASS INDEX: 31.17 KG/M2

## 2024-04-10 DIAGNOSIS — D3A.090 CARCINOID TUMOR DETERMINED BY BIOPSY OF LUNG (CMS-HCC): Primary | ICD-10-CM

## 2024-04-10 PROCEDURE — 99205 OFFICE O/P NEW HI 60 MIN: CPT | Performed by: THORACIC SURGERY (CARDIOTHORACIC VASCULAR SURGERY)

## 2024-04-10 PROCEDURE — 3074F SYST BP LT 130 MM HG: CPT | Performed by: THORACIC SURGERY (CARDIOTHORACIC VASCULAR SURGERY)

## 2024-04-10 PROCEDURE — 4010F ACE/ARB THERAPY RXD/TAKEN: CPT | Performed by: THORACIC SURGERY (CARDIOTHORACIC VASCULAR SURGERY)

## 2024-04-10 PROCEDURE — 3078F DIAST BP <80 MM HG: CPT | Performed by: THORACIC SURGERY (CARDIOTHORACIC VASCULAR SURGERY)

## 2024-04-10 PROCEDURE — 99215 OFFICE O/P EST HI 40 MIN: CPT | Mod: 57 | Performed by: THORACIC SURGERY (CARDIOTHORACIC VASCULAR SURGERY)

## 2024-04-10 RX ORDER — HEPARIN SODIUM 5000 [USP'U]/ML
5000 INJECTION, SOLUTION INTRAVENOUS; SUBCUTANEOUS ONCE
Status: CANCELLED | OUTPATIENT
Start: 2024-04-10 | End: 2024-04-10

## 2024-04-10 RX ORDER — GABAPENTIN 100 MG/1
300 CAPSULE ORAL ONCE
Status: CANCELLED | OUTPATIENT
Start: 2024-04-10 | End: 2024-04-10

## 2024-04-10 RX ORDER — ACETAMINOPHEN 325 MG/1
650 TABLET ORAL ONCE
Status: CANCELLED | OUTPATIENT
Start: 2024-04-10 | End: 2024-04-10

## 2024-04-10 ASSESSMENT — ENCOUNTER SYMPTOMS
OCCASIONAL FEELINGS OF UNSTEADINESS: 1
LOSS OF SENSATION IN FEET: 1
DEPRESSION: 0

## 2024-04-10 ASSESSMENT — PAIN SCALES - GENERAL: PAINLEVEL: 0-NO PAIN

## 2024-04-12 DIAGNOSIS — C34.31 MALIGNANT NEOPLASM OF LOWER LOBE OF RIGHT LUNG (MULTI): Primary | ICD-10-CM

## 2024-04-16 PROBLEM — D3A.090: Status: ACTIVE | Noted: 2024-04-10

## 2024-04-24 ENCOUNTER — PRE-ADMISSION TESTING (OUTPATIENT)
Dept: PREADMISSION TESTING | Facility: HOSPITAL | Age: 64
End: 2024-04-24
Payer: COMMERCIAL

## 2024-04-24 ENCOUNTER — HOSPITAL ENCOUNTER (OUTPATIENT)
Dept: CARDIOLOGY | Facility: HOSPITAL | Age: 64
Discharge: HOME | End: 2024-04-24
Payer: COMMERCIAL

## 2024-04-24 ENCOUNTER — OFFICE VISIT (OUTPATIENT)
Dept: PULMONOLOGY | Facility: HOSPITAL | Age: 64
End: 2024-04-24
Payer: COMMERCIAL

## 2024-04-24 VITALS
BODY MASS INDEX: 30.73 KG/M2 | SYSTOLIC BLOOD PRESSURE: 102 MMHG | DIASTOLIC BLOOD PRESSURE: 66 MMHG | HEART RATE: 70 BPM | TEMPERATURE: 97.9 F | OXYGEN SATURATION: 97 % | WEIGHT: 168 LBS

## 2024-04-24 VITALS
TEMPERATURE: 97.2 F | HEART RATE: 73 BPM | OXYGEN SATURATION: 98 % | HEIGHT: 62 IN | DIASTOLIC BLOOD PRESSURE: 79 MMHG | BODY MASS INDEX: 30.99 KG/M2 | SYSTOLIC BLOOD PRESSURE: 128 MMHG | WEIGHT: 168.4 LBS

## 2024-04-24 DIAGNOSIS — J44.9 CHRONIC OBSTRUCTIVE PULMONARY DISEASE, UNSPECIFIED COPD TYPE (MULTI): Primary | ICD-10-CM

## 2024-04-24 DIAGNOSIS — R91.1 LUNG NODULE: ICD-10-CM

## 2024-04-24 DIAGNOSIS — Z01.818 PREOPERATIVE EXAMINATION: ICD-10-CM

## 2024-04-24 DIAGNOSIS — Z01.818 PREOPERATIVE EXAMINATION: Primary | ICD-10-CM

## 2024-04-24 DIAGNOSIS — I10 HYPERTENSION, UNSPECIFIED TYPE: ICD-10-CM

## 2024-04-24 DIAGNOSIS — D3A.090 CARCINOID TUMOR DETERMINED BY BIOPSY OF LUNG (CMS-HCC): ICD-10-CM

## 2024-04-24 LAB
ABO GROUP (TYPE) IN BLOOD: NORMAL
ANION GAP SERPL CALC-SCNC: 15 MMOL/L (ref 10–20)
ANTIBODY SCREEN: NORMAL
BUN SERPL-MCNC: 10 MG/DL (ref 6–23)
CALCIUM SERPL-MCNC: 9.7 MG/DL (ref 8.6–10.6)
CHLORIDE SERPL-SCNC: 102 MMOL/L (ref 98–107)
CO2 SERPL-SCNC: 25 MMOL/L (ref 21–32)
CREAT SERPL-MCNC: 0.73 MG/DL (ref 0.5–1.05)
EGFRCR SERPLBLD CKD-EPI 2021: >90 ML/MIN/1.73M*2
ERYTHROCYTE [DISTWIDTH] IN BLOOD BY AUTOMATED COUNT: 14.9 % (ref 11.5–14.5)
GLUCOSE SERPL-MCNC: 92 MG/DL (ref 74–99)
HCT VFR BLD AUTO: 38.2 % (ref 36–46)
HGB BLD-MCNC: 11.8 G/DL (ref 12–16)
MCH RBC QN AUTO: 28.5 PG (ref 26–34)
MCHC RBC AUTO-ENTMCNC: 30.9 G/DL (ref 32–36)
MCV RBC AUTO: 92 FL (ref 80–100)
NRBC BLD-RTO: 0 /100 WBCS (ref 0–0)
PLATELET # BLD AUTO: 146 X10*3/UL (ref 150–450)
POTASSIUM SERPL-SCNC: 4.3 MMOL/L (ref 3.5–5.3)
RBC # BLD AUTO: 4.14 X10*6/UL (ref 4–5.2)
RH FACTOR (ANTIGEN D): NORMAL
SODIUM SERPL-SCNC: 138 MMOL/L (ref 136–145)
WBC # BLD AUTO: 3.7 X10*3/UL (ref 4.4–11.3)

## 2024-04-24 PROCEDURE — 86901 BLOOD TYPING SEROLOGIC RH(D): CPT

## 2024-04-24 PROCEDURE — 36415 COLL VENOUS BLD VENIPUNCTURE: CPT

## 2024-04-24 PROCEDURE — 99213 OFFICE O/P EST LOW 20 MIN: CPT | Performed by: INTERNAL MEDICINE

## 2024-04-24 PROCEDURE — 99204 OFFICE O/P NEW MOD 45 MIN: CPT | Performed by: NURSE PRACTITIONER

## 2024-04-24 PROCEDURE — 99213 OFFICE O/P EST LOW 20 MIN: CPT | Mod: GC | Performed by: INTERNAL MEDICINE

## 2024-04-24 PROCEDURE — 87081 CULTURE SCREEN ONLY: CPT

## 2024-04-24 PROCEDURE — 80048 BASIC METABOLIC PNL TOTAL CA: CPT

## 2024-04-24 PROCEDURE — 3074F SYST BP LT 130 MM HG: CPT | Performed by: INTERNAL MEDICINE

## 2024-04-24 PROCEDURE — 93005 ELECTROCARDIOGRAM TRACING: CPT

## 2024-04-24 PROCEDURE — 3078F DIAST BP <80 MM HG: CPT | Performed by: INTERNAL MEDICINE

## 2024-04-24 PROCEDURE — 4010F ACE/ARB THERAPY RXD/TAKEN: CPT | Performed by: INTERNAL MEDICINE

## 2024-04-24 PROCEDURE — 93010 ELECTROCARDIOGRAM REPORT: CPT | Performed by: INTERNAL MEDICINE

## 2024-04-24 PROCEDURE — 85027 COMPLETE CBC AUTOMATED: CPT

## 2024-04-24 RX ORDER — ALBUTEROL SULFATE 0.83 MG/ML
2.5 SOLUTION RESPIRATORY (INHALATION) 4 TIMES DAILY PRN
Qty: 360 ML | Refills: 2 | Status: SHIPPED | OUTPATIENT
Start: 2024-04-24 | End: 2025-04-24

## 2024-04-24 RX ORDER — CHLORHEXIDINE GLUCONATE ORAL RINSE 1.2 MG/ML
SOLUTION DENTAL
Qty: 473 ML | Refills: 0 | Status: SHIPPED | OUTPATIENT
Start: 2024-04-24 | End: 2024-05-04 | Stop reason: HOSPADM

## 2024-04-24 RX ORDER — CHLORHEXIDINE GLUCONATE 40 MG/ML
SOLUTION TOPICAL 2 TIMES DAILY
Qty: 473 ML | Refills: 0 | Status: SHIPPED | OUTPATIENT
Start: 2024-04-24 | End: 2024-05-04 | Stop reason: HOSPADM

## 2024-04-24 RX ORDER — MELOXICAM 100 %
POWDER (GRAM) MISCELLANEOUS
COMMUNITY
Start: 2024-04-12 | End: 2024-04-29 | Stop reason: WASHOUT

## 2024-04-24 SDOH — ECONOMIC STABILITY: FOOD INSECURITY: WITHIN THE PAST 12 MONTHS, YOU WORRIED THAT YOUR FOOD WOULD RUN OUT BEFORE YOU GOT MONEY TO BUY MORE.: NEVER TRUE

## 2024-04-24 SDOH — ECONOMIC STABILITY: FOOD INSECURITY: WITHIN THE PAST 12 MONTHS, THE FOOD YOU BOUGHT JUST DIDN'T LAST AND YOU DIDN'T HAVE MONEY TO GET MORE.: NEVER TRUE

## 2024-04-24 ASSESSMENT — DUKE ACTIVITY SCORE INDEX (DASI)
TOTAL_SCORE: 41.2
CAN YOU WALK A BLOCK OR TWO ON LEVEL GROUND: YES
CAN YOU TAKE CARE OF YOURSELF (EAT, DRESS, BATHE, OR USE TOILET): YES
CAN YOU DO LIGHT WORK AROUND THE HOUSE LIKE DUSTING OR WASHING DISHES: YES
CAN YOU CLIMB A FLIGHT OF STAIRS OR WALK UP A HILL: YES
CAN YOU HAVE SEXUAL RELATIONS: YES
CAN YOU TAKE CARE OF YOURSELF (EAT, DRESS, BATHE, OR USE TOILET): YES
CAN YOU RUN A SHORT DISTANCE: YES
CAN YOU HAVE SEXUAL RELATIONS: YES
CAN YOU WALK INDOORS, SUCH AS AROUND YOUR HOUSE: YES
CAN YOU WALK A BLOCK OR TWO ON LEVEL GROUND: YES
CAN YOU RUN A SHORT DISTANCE: YES
CAN YOU DO YARD WORK LIKE RAKING LEAVES, WEEDING OR PUSHING A MOWER: YES
CAN YOU DO MODERATE WORK AROUND THE HOUSE LIKE VACUUMING, SWEEPING FLOORS OR CARRYING GROCERIES: NO
CAN YOU DO LIGHT WORK AROUND THE HOUSE LIKE DUSTING OR WASHING DISHES: YES
CAN YOU WALK INDOORS, SUCH AS AROUND YOUR HOUSE: YES
CAN YOU PARTICIPATE IN MODERATE RECREATIONAL ACTIVITIES LIKE GOLF, BOWLING, DANCING, DOUBLES TENNIS OR THROWING A BASEBALL OR FOOTBALL: NO
CAN YOU PARTICIPATE IN MODERATE RECREATIONAL ACTIVITIES LIKE GOLF, BOWLING, DANCING, DOUBLES TENNIS OR THROWING A BASEBALL OR FOOTBALL: NO
CAN YOU PARTICIPATE IN STRENOUS SPORTS LIKE SWIMMING, SINGLES TENNIS, FOOTBALL, BASKETBALL, OR SKIING: NO
DASI METS SCORE: 7.8
CAN YOU DO HEAVY WORK AROUND THE HOUSE LIKE SCRUBBING FLOORS OR LIFTING AND MOVING HEAVY FURNITURE: YES
CAN YOU DO MODERATE WORK AROUND THE HOUSE LIKE VACUUMING, SWEEPING FLOORS OR CARRYING GROCERIES: NO
CAN YOU PARTICIPATE IN STRENOUS SPORTS LIKE SWIMMING, SINGLES TENNIS, FOOTBALL, BASKETBALL, OR SKIING: NO
CAN YOU DO YARD WORK LIKE RAKING LEAVES, WEEDING OR PUSHING A MOWER: YES
CAN YOU CLIMB A FLIGHT OF STAIRS OR WALK UP A HILL: YES

## 2024-04-24 ASSESSMENT — LIFESTYLE VARIABLES
SKIP TO QUESTIONS 9-10: 1
HOW OFTEN DO YOU HAVE SIX OR MORE DRINKS ON ONE OCCASION: NEVER
HOW MANY STANDARD DRINKS CONTAINING ALCOHOL DO YOU HAVE ON A TYPICAL DAY: PATIENT DOES NOT DRINK
HOW OFTEN DO YOU HAVE A DRINK CONTAINING ALCOHOL: NEVER
SMOKING_STATUS: NONSMOKER
AUDIT-C TOTAL SCORE: 0

## 2024-04-24 ASSESSMENT — PATIENT HEALTH QUESTIONNAIRE - PHQ9
SUM OF ALL RESPONSES TO PHQ9 QUESTIONS 1 & 2: 0
1. LITTLE INTEREST OR PLEASURE IN DOING THINGS: NOT AT ALL
2. FEELING DOWN, DEPRESSED OR HOPELESS: NOT AT ALL

## 2024-04-24 ASSESSMENT — PAIN SCALES - GENERAL: PAINLEVEL: 0-NO PAIN

## 2024-04-24 ASSESSMENT — ENCOUNTER SYMPTOMS
CARDIOVASCULAR NEGATIVE: 1
NEUROLOGICAL NEGATIVE: 1
RESPIRATORY NEGATIVE: 1
CONSTITUTIONAL NEGATIVE: 1
DYSPNEA WITH EXERTION: 1
NECK NEGATIVE: 1
MUSCULOSKELETAL NEGATIVE: 1
EYES NEGATIVE: 1
ENDOCRINE NEGATIVE: 1
GASTROINTESTINAL NEGATIVE: 1

## 2024-04-24 NOTE — CPM/PAT H&P
CPM/PAT Evaluation       Name: Comfort Bliss (Comfort Bliss)  /Age: 1960/64 y.o.     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  The patient is referred by Dr. Sameer Tate preoperative evaluation of right lower lobe carcinoid tumor scheduled for surgery, COPD, NIDDM, GERD, thyroid nodule under surveillance, hypertension, hypothyroidism, nephrolithiasis, overactive bladder, peripheral neuropathy.    Past Medical History:   Diagnosis Date    Adverse effect of anesthesia     Resp distress, dyspnea    Cancer of lower lobe of right lung (Multi)     right lower lobe carcinoid cancer.    COPD (chronic obstructive pulmonary disease) (Multi)     Diabetes mellitus (Multi)     GERD (gastroesophageal reflux disease)     Hx of thyroid nodule     6 mm right thyroid nodule, Noted on US in     Hypertension     Managed by PCP    Hypothyroidism     Juvenile rheumatic fever     Lung nodule     right lower lobe nodule    Nephrolithiasis     OAB (overactive bladder)     Peripheral neuropathy     Shortness of breath     Vertigo     no recent episodes, last occurence over a year ago    Vision loss     Wears glasses       Past Surgical History:   Procedure Laterality Date    BREAST BIOPSY      BRONCHOSCOPY      COLONOSCOPY      CT CHEST WITHOUT FOR [facility] PLANNING  2024    IMPRESSION: 1. No evidence of acute pathology. 2. Indeterminate slightly lobulated nodule at the superior segment of the right lower lobe, unchanged in size compared to 2024 exam however increased in size compared to 2021 Malignancy can not be completely excluded Consider correlation with tissue sampling3 Several additional stable pulmonary nodules, with representative example as above    SALPINGECTOMY      US THYROID   10/05/2020    IMPRESSION:   Atrophic thyroid gland consistent with known hypothyroidism   6 mm right thyroid nodule       Patient  has no history on file for sexual activity.    Family History   Problem Relation Name Age of Onset    Rheumatic fever Mother      Breast cancer Sister      Diabetes Maternal Grandmother      Rheumatic fever Maternal Grandmother         Allergies   Allergen Reactions    Amoxicillin Shortness of breath and Dizziness    Bee Venom Protein (Honey Bee) Swelling    Penicillins Shortness of breath and Dizziness    Pravastatin Other and Myalgia       Prior to Admission medications    Medication Sig Start Date End Date Taking? Authorizing Provider   amantadine HCl, bulk, powder  6/23/23  Yes Historical Provider, MD   Januvia 100 mg tablet Take 1 tablet (100 mg) by mouth once daily.   Yes Historical Provider, MD   levothyroxine (Synthroid, Levoxyl) 25 mcg tablet Take 1 tablet (25 mcg) by mouth once daily.   Yes Historical Provider, MD   lisinopril 5 mg tablet Take 1 tablet (5 mg) by mouth once daily. 1/3/24  Yes Historical Provider, MD   metFORMIN  mg 24 hr tablet Take 1 tablet (500 mg) by mouth 3 times a day. 10/16/22  Yes Historical Provider, MD   omeprazole (PriLOSEC) 20 mg DR capsule Take 1 capsule (20 mg) by mouth 2 times a day.   Yes Historical Provider, MD   oxybutynin XL (Ditropan-XL) 10 mg 24 hr tablet  3/13/24  Yes Historical Provider, MD   tiotropium-olodateroL (Stiolto Respimat) 2.5-2.5 mcg/actuation mist inhaler Inhale 2 Inhalations once daily. 1/24/24  Yes Mariel Nur MD   albuterol 2.5 mg /3 mL (0.083 %) nebulizer solution Take 3 mL (2.5 mg) by nebulization 4 times a day as needed for wheezing or shortness of breath. 4/24/24 4/24/25  Mariel Nur MD   ALBUTEROL INHL Inhale.    Historical Provider, MD   celecoxib (CeleBREX) 400 mg capsule Take 1 capsule (400 mg) by mouth once daily. 12/14/23   Historical Provider, MD   chlorhexidine (Hibiclens) 4 % external liquid  Apply topically 2 times a day for 5 days. 4/24/24 4/29/24  Samantha A Meeson, APRN-CNP   chlorhexidine (Peridex) 0.12 % solution Swish and spit 15 mL night before surgery and morning of surgery 4/24/24   Samantha A Meeson, APRN-CNP   cholecalciferol (Vitamin D-3) 125 MCG (5000 UT) capsule Take 1 capsule (125 mcg) by mouth once daily.    Historical Provider, MD   meloxicam, bulk, 100 % powder  4/12/24   Historical Provider, MD   OneTouch Delica Plus Lancet 33 gauge misc use 1 LANCET to TEST BLOOD SUGAR twice a day 1/2/24   Historical Provider, MD   OneTouch Ultra Test strip use 1 TEST STRIP to TEST BLOOD SUGAR twice a day as directed 1/3/24   Historical Provider, MD   OneTouch Ultra2 Meter misc use as directed twice a day 1/3/24   Historical Provider, MD IZAGUIRRE ROS:   Constitutional:   neg    Neuro/Psych:   neg    Eyes:   neg     use of corrective lenses  Ears:   neg    Nose:   neg    Mouth:   neg    Throat:   neg    Neck:   neg    Cardio:   neg     WATKINS  Respiratory:   neg    Endocrine:   neg    GI:   neg    :   neg    Musculoskeletal:   neg    Hematologic:   neg    Skin:  neg        Physical Exam  Vitals reviewed.   Constitutional:       Appearance: Normal appearance.   HENT:      Head: Normocephalic.      Mouth/Throat:      Mouth: Mucous membranes are moist.   Eyes:      Conjunctiva/sclera: Conjunctivae normal.   Neck:      Vascular: No carotid bruit.   Cardiovascular:      Rate and Rhythm: Normal rate and regular rhythm.      Pulses: Normal pulses.      Heart sounds: Normal heart sounds.   Pulmonary:      Effort: Pulmonary effort is normal.      Breath sounds: Normal breath sounds.   Abdominal:      Palpations: Abdomen is soft.      Tenderness: There is no abdominal tenderness.   Musculoskeletal:         General: Normal range of motion.      Cervical back: Normal range of motion.      Right lower leg: No edema.      Left lower leg: No edema.   Lymphadenopathy:      Cervical: No cervical adenopathy.   Skin:      General: Skin is warm and dry.      Capillary Refill: Capillary refill takes less than 2 seconds.   Neurological:      General: No focal deficit present.      Mental Status: She is alert and oriented to person, place, and time.   Psychiatric:         Mood and Affect: Mood normal.         Behavior: Behavior normal.         Thought Content: Thought content normal.         Judgment: Judgment normal.          PAT AIRWAY:   Airway:     Mallampati::  III    Neck ROM::  Full   upper dentures      Visit Vitals  /79   Pulse 73   Temp 36.2 °C (97.2 °F)       DASI Risk Score      Flowsheet Row Most Recent Value   DASI SCORE 41.2   METS Score (Will be calculated only when all the questions are answered) 7.8          Caprini DVT Assessment      Flowsheet Row Most Recent Value   DVT Score 13   Current Status COPD, Major surgery planned, lasting over 3 hours, Present cancer or chemotherapy   History Prior major surgery, COPD   Age 60-75 years   BMI 30 or less          Modified Frailty Index      Flowsheet Row Most Recent Value   Modified Frailty Index Calculator .2727          CHADS2 Stroke Risk  Current as of 22 minutes ago        N/A 3 to 100%: High Risk   2 to < 3%: Medium Risk   0 to < 2%: Low Risk     Last Change: N/A          This score determines the patient's risk of having a stroke if the patient has atrial fibrillation.        This score is not applicable to this patient. Components are not calculated.          Revised Cardiac Risk Index      Flowsheet Row Most Recent Value   Revised Cardiac Risk Calculator 1          Apfel Simplified Score      Flowsheet Row Most Recent Value   Apfel Simplified Score Calculator 3          Risk Analysis Index Results This Encounter    No data found in the last 1 encounters.       Stop Bang Score      Flowsheet Row Most Recent Value   Do you snore loudly? 1   Do you often feel tired or fatigued after your sleep? 1   Has anyone ever observed you stop breathing in your sleep? 1   Do  you have or are you being treated for high blood pressure? 1   Recent BMI (Calculated) 31   Is BMI greater than 35 kg/m2? 0=No   Age older than 50 years old? 1=Yes   Is your neck circumference greater than 17 inches (Male) or 16 inches (Female)? 0   Gender - Male 0=No   STOP-BANG Total Score 5            Assessment and Plan:   Neuro:  peripheral neuropathy managed not currently on medications.    The patient is at an increased risk for post operative delirium secondary to type and duration of surgery.  Preoperative brain exercise educational handout provided to patient.    The patient is at an increased risk for perioperative stroke secondary to HTN, DM , female sex , general anesthesia, and op time >2.5 hours.     HEENT/Airway:  No diagnosis or significant findings on chart review or clinical presentation and evaluation.     Cardiovascular:  hypertension managed on lisinopril (hold 24 hours). EKG in office today NSR. No additional preoperative testing is currently indicated.    METS are 7.8    RCRI  1 which is 6%  30 day risk of MACE (risk for cardiac death, nonfatal myocardial infarction, and nonfactal cardiac arrest    SONIYA score which indicates a 0.4% risk of intraoperative or 30-day postoperative MACE      Pulmonary:  right lower lobe carcinoid tumor scheduled for surgery, COPD (moderate obstruction on PFTs) well-controlled with no recent exacerbations. Using rescue inhaler once a week which has improved in the last two months since addition of stiolto (continue). Recommend prioritizing non-opioid analgesic techniques (regional and local anesthesia, nonsteroidal medications, etc) before the administration of opioids and close monitoring for hypoventilation after surgery due to suspected MARCE. If intravenous narcotics are needed beyond the immediate uriel-operative period, the patient may benefit from continuous pulse oximetry to monitor for hypoxic events till baseline Sp02 is normal on room air and a respiratory  "therapy evaluation. Patient currently being worked up for sleep apnea.  History of \"respiratory distress\" following colonoscopy and prior tubal pregnancy surgery thought to be due to anxiety and untreated sleep apnea- consider extubating to CPAP. Both episodes resolved once patient was completely awake from anesthesia. Follows with pulmonology, Dr. Mariel Nur- last visit 24- referred for thoracic surgery for lung nodule positive for carcinoid. Preoperative deep breathing educational handout provided to patient.    ARISCAT:  50    points which is a high (42.1%) risk of in-hospital post-op pulmonary complications     PRODIGY:  13  points which is a intermediate risk of post op opioid induced respiratory depression episodes    STOP BAN   points which is a high risk for moderate to severe MARCE    Renal: nephrolithiasis with no current s/s or concerns.  overactive bladder managed on oxybutynin (hold 24 hours).    The patient is at increased risk of perioperative renal complications secondary to age>/= 56, HTN, and diabetes. Preventative measures include preoperative BP control and holding ACE 24 hours.    Endocrine:  NIDDM managed on januvia (hold 24 hours) and meformin (hold 24 hours). A1c on 24 7.3%. Thyroid nodule under surveillance, and hypothyroidism managed on levothyroxine (continue), patient asymptomatic- TSH 24 WNL at 2.46. Both followed by MARCIAL Esteves at [facility] - last visit 24.    Hematologic:   No diagnosis or significant findings on chart review or clinical presentation and evaluation however see below risk screening tool.  Preoperative DVT educational handout provided to patient.    Caprini Score:  13  points which is a highest risk of perioperative VTE    Gastrointestinal:   GERD managed on omeprazole (continue).    EAT-10 score of  0- self-perceived oropharyngeal dysphagia scale (0-40)     Apfel: 3 points 61%% risk for post operative " N/V    Infectious disease:  No diagnosis or significant findings on chart review or clinical presentation and evaluation.      Musculoskeletal:  No diagnosis or significant findings on chart review or clinical presentation and evaluation.       Labs ordered  Recent Results (from the past 168 hour(s))   Type And Screen    Collection Time: 04/24/24  3:50 PM   Result Value Ref Range    ABO TYPE O     Rh TYPE POS     ANTIBODY SCREEN NEG    CBC    Collection Time: 04/24/24  3:50 PM   Result Value Ref Range    WBC 3.7 (L) 4.4 - 11.3 x10*3/uL    nRBC 0.0 0.0 - 0.0 /100 WBCs    RBC 4.14 4.00 - 5.20 x10*6/uL    Hemoglobin 11.8 (L) 12.0 - 16.0 g/dL    Hematocrit 38.2 36.0 - 46.0 %    MCV 92 80 - 100 fL    MCH 28.5 26.0 - 34.0 pg    MCHC 30.9 (L) 32.0 - 36.0 g/dL    RDW 14.9 (H) 11.5 - 14.5 %    Platelets 146 (L) 150 - 450 x10*3/uL   Basic Metabolic Panel    Collection Time: 04/24/24  3:50 PM   Result Value Ref Range    Glucose 92 74 - 99 mg/dL    Sodium 138 136 - 145 mmol/L    Potassium 4.3 3.5 - 5.3 mmol/L    Chloride 102 98 - 107 mmol/L    Bicarbonate 25 21 - 32 mmol/L    Anion Gap 15 10 - 20 mmol/L    Urea Nitrogen 10 6 - 23 mg/dL    Creatinine 0.73 0.50 - 1.05 mg/dL    eGFR >90 >60 mL/min/1.73m*2    Calcium 9.7 8.6 - 10.6 mg/dL   Staphylococcus aureus/MRSA colonization, Culture    Collection Time: 04/24/24  3:50 PM    Specimen: Nares/Axilla/Groin; Swab   Result Value Ref Range    Staph/MRSA Screen Culture No Staphylococcus aureus isolated

## 2024-04-24 NOTE — PATIENT INSTRUCTIONS
Thanks for coming in today. Here is what we discussed:  -please complete your pre-surgical workup for anticipated thoracic surgery  -I will repeat PFTs  -no prohibitive risk from pulmonary perspective  -continue Stiolto, albuterol PRN  -I have sent a nebulizer home and nebulized albuterol to the pharmacy  -RTC in 3 months

## 2024-04-24 NOTE — PREPROCEDURE INSTRUCTIONS
Thank you for visiting [facility] ([facility]) today for your pre-procedure evaluation, you were seen by     Samantha Meeson, MSN, NP-C  Adult-Gerontology Nurse Practitioner II  Department of Anesthesiology and Perioperative Medicine  Main phone [phone]  Direct phone [phone]  Fax [phone]     This summary includes instructions and information to aid you during your perioperative period.  Please read carefully. If you have any questions about your visit today, please call the number listed above.  If you become ill or have any changes to your health before your surgery, please contact your primary care provider and alert your surgeon.    Preparing for your Surgery       Exercises  Preoperative Deep Breathing Exercises  Why it is important to do deep breathing exercises before my surgery?  Deep breathing exercises strengthen your breathing muscles.  This helps you to recover after your surgery and decreases the chance of breathing complications.  How are the deep breathing exercises done?  Sit straight with your back supported.  Breathe in deeply and slowly through your nose. Your lower rib cage should expand and your abdomen may move forward.  Hold that breath for 3 to 5 seconds.  Breathe out through pursed lips, slowly and completely.  Rest and repeat 10 times every hour while awake.  Rest longer if you become dizzy or lightheaded.       Incentive Spirometer   You were provided with an incentive spirometer in [facility], please follow the below instructions.   You were not provided an incentive spirometer in [facility], please disregard the incentive spirometer instructions  What is an incentive spirometer?  An incentive spirometer is a device used before and after surgery to “exercise” your lungs.  It helps you to take deeper breaths to expand your lungs.  Below is an example of a basic incentive spirometer.  The device you receive may differ slightly but they all function the  same.    Why do I need to use an incentive spirometer?  Using your incentive spirometer prepares your lungs for surgery and helps prevent lung problems after surgery.  How do I use my incentive spirometer?  When you're using your incentive spirometer, make sure to breathe through your mouth. If you breathe through your nose, the incentive spirometer won't work properly. You can hold your nose if you have trouble.  If you feel dizzy at any time, stop and rest. Try again at a later time.  Follow the steps below:  Set up your incentive spirometer, expand the flexible tubing and connect to the outlet.  Sit upright in a chair or bed. Hold the incentive spirometer at eye level.   Put the mouthpiece in your mouth and close your lips tightly around it. Slowly breathe out (exhale) completely.  Breathe in (inhale) slowly through your mouth as deeply as you can. As you take a breath, you will see the piston rise inside the large column. While the piston rises, the indicator should move upwards. It should stay in between the 2 arrows (see Figure).  Try to get the piston as high as you can, while keeping the indicator between the arrows.   If the indicator doesn't stay between the arrows, you're breathing either too fast or too slow.  When you get it as high as you can, hold your breath for 10 seconds, or as long as possible. While you're holding your breath, the piston will slowly fall to the base of the spirometer.  Once the piston reaches the bottom of the spirometer, breathe out slowly through your mouth. Rest for a few seconds.  Repeat 10 times. Try to get the piston to the same level with each breath.  Repeat every hour while awake  You can carefully clean the outside of the mouthpiece with an alcohol wipe or soap and water.      Preoperative Brain Exercises    What are brain exercises?  A brain exercise is any activity that engages your thinking (cognitive) skills.    What types of activities are considered brain  exercises?  Jigsaw puzzles, crossword puzzles, word jumble, memory games, word search, and many more.  Many can be found free online or on your phone via a mobile chandra.    Why should I do brain exercises before my surgery?  More recent research has shown brain exercise before surgery can lower the risk of postoperative delirium (confusion) which can be especially important for older adults.  Patients who did brain exercises for 5 to 10 hours the days before surgery, cut their risk of postoperative delirium in half up to 1 week after surgery.    Sit-to-Stand Exercise    What is the sit-to-stand exercise?  The sit-to-stand exercise strengthens the muscles of your lower body and muscles in the center of your body (core muscles for stability) helping to maintain and improve your strength and mobility.  How do I do the sit-to-stand exercise?  The goal is to do this exercise without using your arms or hands.  If this is too difficult, use your arms and hands or a chair with armrests to help slowly push yourself to the standing position and lower yourself back to the sitting position. As the movement becomes easier use your arms and hands less.    Steps to the sit-to-stand exercise  Sit up tall in a sturdy chair, knees bent, feet flat on the floor shoulder-width apart.  Shift your hips/pelvis forward in the chair to correctly position yourself for the next movement.  Lean forward at your hips.  Stand up straight putting equal weight on both feet.  Check to be sure you are properly aligned with the chair, in a slow controlled movement sit back down.  Repeat this exercise 10-15 times.  If needed you can do it fewer times until your strength improves.  Rest for 1 minute.  Do another 10-15 sit-to-stand exercises.  Try to do this in the morning and evening.        Instructions    Preoperative Fasting Guidelines    Why must I stop eating and drinking near surgery time?  With sedation, food or liquid in your stomach can enter your  lungs causing serious complications  Food can increase nausea and vomiting  When do I need to stop eating and drinking before my surgery?      Do not eat any food after midnight the night before your surgery/procedure. You may have up to 13.5 ounces of clear liquid until TWO hours before your instructed arrival time to the hospital.  This includes water, black tea/coffee, (no milk or cream) apple juice, and electrolyte drinks (Gatorade). You may chew gum until TWO hours before your surgery/procedure            Simple things you can do to help prevent blood clots     Blood clots are blockages that can form in the body's veins. When a blood clot forms in your deep veins, it may be called a deep vein thrombosis, or DVT for short. Blood clots can happen in any part of the body where blood flows, but they are most common in the arms and legs. If a piece of a blood clot breaks free and travels to the lungs, it is called a pulmonary embolus (PE). A PE can be a very serious problem.         Being in the hospital or having surgery can raise your chances of getting a blood clot because you may not be well enough to move around as much as you normally do.         Ways you can help prevent blood clots in the hospital       Wearing SCDs  SCDs stands for Sequential Compression Devices.   SCDs are special sleeves that wrap around your legs. They attach to a pump that fills them with air to gently squeeze your legs every few minutes.  This helps return the blood in your legs to your heart.   SCDs should only be taken off when walking or bathing. SCDs may not be comfortable, but they can help save your life.              Pump SCD leg sleeves  Wearing compression stockings - if your doctor orders them. These special snug-fitting stockings gently squeeze your legs to help blood flow.       Walking. Walking helps move the blood in your legs.   If your doctor says it is ok, try walking the halls at least   5 times a day. Ask us to help  you get up, so you don't fall.      Taking any blood-thinning medicines your doctor orders.              Ways you can help prevent blood clots at home         Wearing compression stockings - if your doctor orders them.   Walking - to help move the blood in your legs.    Taking any blood-thinning medicines your doctor orders.      Signs of a blood clot or PE    Tell your doctor or nurse right away if you have any of the problems listed below.         If you are at home, seek medical care right away. Call 911 for chest pain or problems breathing.            Signs of a blood clot (DVT) - such as pain, swelling, redness, or warmth in your arm or legs.  Signs of a pulmonary embolism (PE) - such as chest pain or feeling short of breath      Tobacco and Alcohol;  Do not drink alcohol or smoke within 24 hours of surgery.  It is best to quit smoking for as long as possible before any surgery or procedure.      The Week before Surgery        Seven days before Surgery  Check your [facility] medication instructions  Do the exercises provided to you by [facility]   Arrange for a responsible, adult licensed  to take you home after surgery and stay with you for 24 hours.  You will not be permitted to drive yourself home if you have received any anesthetic/sedation  Six days before surgery  Check your [facility] medication instructions  Do the exercises provided to you by [facility]   Start using Chlorhexidene (CHG) body wash if prescribed  Five days before surgery  Check your [facility] medication instructions  Do the exercises provided to you by [facility]   Continue to use CHG body wash if prescribed  Three days before surgery  Check your [facility] medication instructions  Do the exercises provided to you by [facility]   Continue to use CHG body wash if prescribed  Two days before surgery  Check your [facility] medication instructions  Do the exercises provided to you by [facility]   Continue to use CHG body wash if prescribed    The Day before Surgery       Check your [facility] medication and  all other [facility] instructions including when to stop eating and drinking  You will be called with your arrival time for surgery in the late afternoon.  If you do not receive a call please reach out to your surgeon's office.  Do not smoke or drink 24 hours before surgery  Prepare items to bring with you to the hospital  Shower with your chlorhexidine wash if prescribed  Brush your teeth and use your chlorhexidine dental rinse if prescribed    The Day of Surgery       Check your [facility] medication instructions  Ensure you follow the instructions for when to stop eating and drinking  Shower, if prescribed use CHG.  Do not apply any lotions, creams, moisturizers, perfume or deodorant  Brush your teeth and use your CHG dental rinse if prescribed  Wear loose comfortable clothing  Avoid make-up  Remove  jewelry and piercings, consider professional piercing removal with a plastic spacer if needed  Bring photo ID and Insurance card  Bring an accurate medication list that includes medication dose, frequency and allergies  Bring a copy of your advanced directives (will, health care power of )  Bring any devices and controllers as well as medical devices you have been provided with for surgery (CPAP, slings, braces, etc.)  Dentures, eyeglasses, and contacts will be removed before surgery, please bring cases for contacts or glasses

## 2024-04-24 NOTE — H&P (VIEW-ONLY)
CPM/PAT Evaluation       Name: Comfort Bliss (Comfort Bliss)  /Age: 1960/64 y.o.     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  The patient is referred by Dr. Sameer Tate preoperative evaluation of right lower lobe carcinoid tumor scheduled for surgery, COPD, NIDDM, GERD, thyroid nodule under surveillance, hypertension, hypothyroidism, nephrolithiasis, overactive bladder, peripheral neuropathy.    Past Medical History:   Diagnosis Date    Adverse effect of anesthesia     Resp distress, dyspnea    Cancer of lower lobe of right lung (Multi)     right lower lobe carcinoid cancer.    COPD (chronic obstructive pulmonary disease) (Multi)     Diabetes mellitus (Multi)     GERD (gastroesophageal reflux disease)     Hx of thyroid nodule     6 mm right thyroid nodule, Noted on US in     Hypertension     Managed by PCP    Hypothyroidism     Juvenile rheumatic fever     Lung nodule     right lower lobe nodule    Nephrolithiasis     OAB (overactive bladder)     Peripheral neuropathy     Shortness of breath     Vertigo     no recent episodes, last occurence over a year ago    Vision loss     Wears glasses       Past Surgical History:   Procedure Laterality Date    BREAST BIOPSY      BRONCHOSCOPY      COLONOSCOPY      CT CHEST WITHOUT FOR [facility]  2024    IMPRESSION: 1. No evidence of acute pathology. 2. Indeterminate slightly lobulated nodule at the superior segment of the right lower lobe, unchanged in size compared to 2024 exam however increased in size compared to 2021 Malignancy can not be completely excluded Consider correlation with tissue sampling3 Several additional stable pulmonary nodules, with representative example as above    SALPINGECTOMY      US THYROID   10/05/2020    IMPRESSION:   Atrophic thyroid gland consistent with known hypothyroidism   6 mm right thyroid nodule       Patient  has no history on file for sexual activity.    Family History   Problem Relation Name Age of Onset    Rheumatic fever Mother      Breast cancer Sister      Diabetes Maternal Grandmother      Rheumatic fever Maternal Grandmother         Allergies   Allergen Reactions    Amoxicillin Shortness of breath and Dizziness    Bee Venom Protein (Honey Bee) Swelling    Penicillins Shortness of breath and Dizziness    Pravastatin Other and Myalgia       Prior to Admission medications    Medication Sig Start Date End Date Taking? Authorizing Provider   amantadine HCl, bulk, powder  6/23/23  Yes Historical Provider, MD   Januvia 100 mg tablet Take 1 tablet (100 mg) by mouth once daily.   Yes Historical Provider, MD   levothyroxine (Synthroid, Levoxyl) 25 mcg tablet Take 1 tablet (25 mcg) by mouth once daily.   Yes Historical Provider, MD   lisinopril 5 mg tablet Take 1 tablet (5 mg) by mouth once daily. 1/3/24  Yes Historical Provider, MD   metFORMIN  mg 24 hr tablet Take 1 tablet (500 mg) by mouth 3 times a day. 10/16/22  Yes Historical Provider, MD   omeprazole (PriLOSEC) 20 mg DR capsule Take 1 capsule (20 mg) by mouth 2 times a day.   Yes Historical Provider, MD   oxybutynin XL (Ditropan-XL) 10 mg 24 hr tablet  3/13/24  Yes Historical Provider, MD   tiotropium-olodateroL (Stiolto Respimat) 2.5-2.5 mcg/actuation mist inhaler Inhale 2 Inhalations once daily. 1/24/24  Yes Mariel Nur MD   albuterol 2.5 mg /3 mL (0.083 %) nebulizer solution Take 3 mL (2.5 mg) by nebulization 4 times a day as needed for wheezing or shortness of breath. 4/24/24 4/24/25  Mariel Nur MD   ALBUTEROL INHL Inhale.    Historical Provider, MD   celecoxib (CeleBREX) 400 mg capsule Take 1 capsule (400 mg) by mouth once daily. 12/14/23   Historical Provider, MD   chlorhexidine (Hibiclens) 4 % external liquid  Apply topically 2 times a day for 5 days. 4/24/24 4/29/24  Samantha A Meeson, APRN-CNP   chlorhexidine (Peridex) 0.12 % solution Swish and spit 15 mL night before surgery and morning of surgery 4/24/24   Samantha A Meeson, APRN-CNP   cholecalciferol (Vitamin D-3) 125 MCG (5000 UT) capsule Take 1 capsule (125 mcg) by mouth once daily.    Historical Provider, MD   meloxicam, bulk, 100 % powder  4/12/24   Historical Provider, MD   OneTouch Delica Plus Lancet 33 gauge misc use 1 LANCET to TEST BLOOD SUGAR twice a day 1/2/24   Historical Provider, MD   OneTouch Ultra Test strip use 1 TEST STRIP to TEST BLOOD SUGAR twice a day as directed 1/3/24   Historical Provider, MD   OneTouch Ultra2 Meter misc use as directed twice a day 1/3/24   Historical Provider, MD IZAGUIRRE ROS:   Constitutional:   neg    Neuro/Psych:   neg    Eyes:   neg     use of corrective lenses  Ears:   neg    Nose:   neg    Mouth:   neg    Throat:   neg    Neck:   neg    Cardio:   neg     WATKINS  Respiratory:   neg    Endocrine:   neg    GI:   neg    :   neg    Musculoskeletal:   neg    Hematologic:   neg    Skin:  neg        Physical Exam  Vitals reviewed.   Constitutional:       Appearance: Normal appearance.   HENT:      Head: Normocephalic.      Mouth/Throat:      Mouth: Mucous membranes are moist.   Eyes:      Conjunctiva/sclera: Conjunctivae normal.   Neck:      Vascular: No carotid bruit.   Cardiovascular:      Rate and Rhythm: Normal rate and regular rhythm.      Pulses: Normal pulses.      Heart sounds: Normal heart sounds.   Pulmonary:      Effort: Pulmonary effort is normal.      Breath sounds: Normal breath sounds.   Abdominal:      Palpations: Abdomen is soft.      Tenderness: There is no abdominal tenderness.   Musculoskeletal:         General: Normal range of motion.      Cervical back: Normal range of motion.      Right lower leg: No edema.      Left lower leg: No edema.   Lymphadenopathy:      Cervical: No cervical adenopathy.   Skin:      General: Skin is warm and dry.      Capillary Refill: Capillary refill takes less than 2 seconds.   Neurological:      General: No focal deficit present.      Mental Status: She is alert and oriented to person, place, and time.   Psychiatric:         Mood and Affect: Mood normal.         Behavior: Behavior normal.         Thought Content: Thought content normal.         Judgment: Judgment normal.          PAT AIRWAY:   Airway:     Mallampati::  III    Neck ROM::  Full   upper dentures      Visit Vitals  /79   Pulse 73   Temp 36.2 °C (97.2 °F)       DASI Risk Score      Flowsheet Row Most Recent Value   DASI SCORE 41.2   METS Score (Will be calculated only when all the questions are answered) 7.8          Caprini DVT Assessment      Flowsheet Row Most Recent Value   DVT Score 13   Current Status COPD, Major surgery planned, lasting over 3 hours, Present cancer or chemotherapy   History Prior major surgery, COPD   Age 60-75 years   BMI 30 or less          Modified Frailty Index      Flowsheet Row Most Recent Value   Modified Frailty Index Calculator .2727          CHADS2 Stroke Risk  Current as of 22 minutes ago        N/A 3 to 100%: High Risk   2 to < 3%: Medium Risk   0 to < 2%: Low Risk     Last Change: N/A          This score determines the patient's risk of having a stroke if the patient has atrial fibrillation.        This score is not applicable to this patient. Components are not calculated.          Revised Cardiac Risk Index      Flowsheet Row Most Recent Value   Revised Cardiac Risk Calculator 1          Apfel Simplified Score      Flowsheet Row Most Recent Value   Apfel Simplified Score Calculator 3          Risk Analysis Index Results This Encounter    No data found in the last 1 encounters.       Stop Bang Score      Flowsheet Row Most Recent Value   Do you snore loudly? 1   Do you often feel tired or fatigued after your sleep? 1   Has anyone ever observed you stop breathing in your sleep? 1   Do  you have or are you being treated for high blood pressure? 1   Recent BMI (Calculated) 31   Is BMI greater than 35 kg/m2? 0=No   Age older than 50 years old? 1=Yes   Is your neck circumference greater than 17 inches (Male) or 16 inches (Female)? 0   Gender - Male 0=No   STOP-BANG Total Score 5            Assessment and Plan:   Neuro:  peripheral neuropathy managed not currently on medications.    The patient is at an increased risk for post operative delirium secondary to type and duration of surgery.  Preoperative brain exercise educational handout provided to patient.    The patient is at an increased risk for perioperative stroke secondary to HTN, DM , female sex , general anesthesia, and op time >2.5 hours.     HEENT/Airway:  No diagnosis or significant findings on chart review or clinical presentation and evaluation.     Cardiovascular:  hypertension managed on lisinopril (hold 24 hours). EKG in office today NSR. No additional preoperative testing is currently indicated.    METS are 7.8    RCRI  1 which is 6%  30 day risk of MACE (risk for cardiac death, nonfatal myocardial infarction, and nonfactal cardiac arrest    SONIYA score which indicates a 0.4% risk of intraoperative or 30-day postoperative MACE      Pulmonary:  right lower lobe carcinoid tumor scheduled for surgery, COPD (moderate obstruction on PFTs) well-controlled with no recent exacerbations. Using rescue inhaler once a week which has improved in the last two months since addition of stiolto (continue). Recommend prioritizing non-opioid analgesic techniques (regional and local anesthesia, nonsteroidal medications, etc) before the administration of opioids and close monitoring for hypoventilation after surgery due to suspected MARCE. If intravenous narcotics are needed beyond the immediate uriel-operative period, the patient may benefit from continuous pulse oximetry to monitor for hypoxic events till baseline Sp02 is normal on room air and a respiratory  "therapy evaluation. Patient currently being worked up for sleep apnea.  History of \"respiratory distress\" following colonoscopy and prior tubal pregnancy surgery thought to be due to anxiety and untreated sleep apnea- consider extubating to CPAP. Both episodes resolved once patient was completely awake from anesthesia. Follows with pulmonology, Dr. Mariel Nur- last visit 24- referred for thoracic surgery for lung nodule positive for carcinoid. Preoperative deep breathing educational handout provided to patient.    ARISCAT:  50    points which is a high (42.1%) risk of in-hospital post-op pulmonary complications     PRODIGY:  13  points which is a intermediate risk of post op opioid induced respiratory depression episodes    STOP BAN   points which is a high risk for moderate to severe MARCE    Renal: nephrolithiasis with no current s/s or concerns.  overactive bladder managed on oxybutynin (hold 24 hours).    The patient is at increased risk of perioperative renal complications secondary to age>/= 56, HTN, and diabetes. Preventative measures include preoperative BP control and holding ACE 24 hours.    Endocrine:  NIDDM managed on januvia (hold 24 hours) and meformin (hold 24 hours). A1c on 24 7.3%. Thyroid nodule under surveillance, and hypothyroidism managed on levothyroxine (continue), patient asymptomatic- TSH 24 WNL at 2.46. Both followed by MARCIAL Esteves at [facility] - last visit 24.    Hematologic:   No diagnosis or significant findings on chart review or clinical presentation and evaluation however see below risk screening tool.  Preoperative DVT educational handout provided to patient.    Caprini Score:  13  points which is a highest risk of perioperative VTE    Gastrointestinal:   GERD managed on omeprazole (continue).    EAT-10 score of  0- self-perceived oropharyngeal dysphagia scale (0-40)     Apfel: 3 points 61%% risk for post operative " N/V    Infectious disease:  No diagnosis or significant findings on chart review or clinical presentation and evaluation.      Musculoskeletal:  No diagnosis or significant findings on chart review or clinical presentation and evaluation.       Labs ordered  Recent Results (from the past 168 hour(s))   Type And Screen    Collection Time: 04/24/24  3:50 PM   Result Value Ref Range    ABO TYPE O     Rh TYPE POS     ANTIBODY SCREEN NEG    CBC    Collection Time: 04/24/24  3:50 PM   Result Value Ref Range    WBC 3.7 (L) 4.4 - 11.3 x10*3/uL    nRBC 0.0 0.0 - 0.0 /100 WBCs    RBC 4.14 4.00 - 5.20 x10*6/uL    Hemoglobin 11.8 (L) 12.0 - 16.0 g/dL    Hematocrit 38.2 36.0 - 46.0 %    MCV 92 80 - 100 fL    MCH 28.5 26.0 - 34.0 pg    MCHC 30.9 (L) 32.0 - 36.0 g/dL    RDW 14.9 (H) 11.5 - 14.5 %    Platelets 146 (L) 150 - 450 x10*3/uL   Basic Metabolic Panel    Collection Time: 04/24/24  3:50 PM   Result Value Ref Range    Glucose 92 74 - 99 mg/dL    Sodium 138 136 - 145 mmol/L    Potassium 4.3 3.5 - 5.3 mmol/L    Chloride 102 98 - 107 mmol/L    Bicarbonate 25 21 - 32 mmol/L    Anion Gap 15 10 - 20 mmol/L    Urea Nitrogen 10 6 - 23 mg/dL    Creatinine 0.73 0.50 - 1.05 mg/dL    eGFR >90 >60 mL/min/1.73m*2    Calcium 9.7 8.6 - 10.6 mg/dL   Staphylococcus aureus/MRSA colonization, Culture    Collection Time: 04/24/24  3:50 PM    Specimen: Nares/Axilla/Groin; Swab   Result Value Ref Range    Staph/MRSA Screen Culture No Staphylococcus aureus isolated

## 2024-04-26 ENCOUNTER — HOSPITAL ENCOUNTER (OUTPATIENT)
Dept: RADIOLOGY | Facility: HOSPITAL | Age: 64
Discharge: HOME | End: 2024-04-26
Payer: COMMERCIAL

## 2024-04-26 DIAGNOSIS — C34.31 MALIGNANT NEOPLASM OF LOWER LOBE OF RIGHT LUNG (MULTI): ICD-10-CM

## 2024-04-26 LAB — STAPHYLOCOCCUS SPEC CULT: NORMAL

## 2024-04-26 PROCEDURE — 78815 PET IMAGE W/CT SKULL-THIGH: CPT | Mod: PET TUMOR INIT TX STRAT | Performed by: NUCLEAR MEDICINE

## 2024-04-26 PROCEDURE — 78815 PET IMAGE W/CT SKULL-THIGH: CPT | Mod: PI

## 2024-04-26 PROCEDURE — A9552 F18 FDG: HCPCS | Performed by: THORACIC SURGERY (CARDIOTHORACIC VASCULAR SURGERY)

## 2024-04-26 PROCEDURE — 3430000001 HC RX 343 DIAGNOSTIC RADIOPHARMACEUTICALS: Performed by: THORACIC SURGERY (CARDIOTHORACIC VASCULAR SURGERY)

## 2024-04-26 RX ORDER — FLUDEOXYGLUCOSE F 18 200 MCI/ML
14.4 INJECTION, SOLUTION INTRAVENOUS
Status: COMPLETED | OUTPATIENT
Start: 2024-04-26 | End: 2024-04-26

## 2024-04-26 RX ADMIN — FLUDEOXYGLUCOSE F 18 14.4 MILLICURIE: 200 INJECTION, SOLUTION INTRAVENOUS at 08:24

## 2024-04-29 DIAGNOSIS — J44.9 CHRONIC OBSTRUCTIVE PULMONARY DISEASE, UNSPECIFIED COPD TYPE (MULTI): ICD-10-CM

## 2024-04-29 PROCEDURE — 93005 ELECTROCARDIOGRAM TRACING: CPT

## 2024-05-02 ENCOUNTER — ANESTHESIA EVENT (OUTPATIENT)
Dept: OPERATING ROOM | Facility: HOSPITAL | Age: 64
End: 2024-05-02
Payer: COMMERCIAL

## 2024-05-03 ENCOUNTER — APPOINTMENT (OUTPATIENT)
Dept: RADIOLOGY | Facility: HOSPITAL | Age: 64
End: 2024-05-03
Payer: COMMERCIAL

## 2024-05-03 ENCOUNTER — HOSPITAL ENCOUNTER (INPATIENT)
Facility: HOSPITAL | Age: 64
LOS: 1 days | Discharge: HOME | End: 2024-05-04
Attending: THORACIC SURGERY (CARDIOTHORACIC VASCULAR SURGERY) | Admitting: THORACIC SURGERY (CARDIOTHORACIC VASCULAR SURGERY)
Payer: COMMERCIAL

## 2024-05-03 ENCOUNTER — ANESTHESIA (OUTPATIENT)
Dept: OPERATING ROOM | Facility: HOSPITAL | Age: 64
End: 2024-05-03
Payer: COMMERCIAL

## 2024-05-03 DIAGNOSIS — R26.81 UNSTEADY GAIT: ICD-10-CM

## 2024-05-03 DIAGNOSIS — G89.18 POST-OP PAIN: ICD-10-CM

## 2024-05-03 DIAGNOSIS — D3A.090 CARCINOID TUMOR DETERMINED BY BIOPSY OF LUNG (CMS-HCC): Primary | ICD-10-CM

## 2024-05-03 LAB
ABO GROUP (TYPE) IN BLOOD: NORMAL
ANION GAP BLDA CALCULATED.4IONS-SCNC: 12 MMO/L (ref 10–25)
ANION GAP BLDA CALCULATED.4IONS-SCNC: 14 MMO/L (ref 10–25)
ANION GAP BLDA CALCULATED.4IONS-SCNC: 15 MMO/L (ref 10–25)
BASE EXCESS BLDA CALC-SCNC: -4.3 MMOL/L (ref -2–3)
BASE EXCESS BLDA CALC-SCNC: -4.4 MMOL/L (ref -2–3)
BASE EXCESS BLDA CALC-SCNC: -5.4 MMOL/L (ref -2–3)
BODY TEMPERATURE: 37 DEGREES CELSIUS
CA-I BLDA-SCNC: 1.17 MMOL/L (ref 1.1–1.33)
CA-I BLDA-SCNC: 1.17 MMOL/L (ref 1.1–1.33)
CA-I BLDA-SCNC: 1.18 MMOL/L (ref 1.1–1.33)
CHLORIDE BLDA-SCNC: 105 MMOL/L (ref 98–107)
CHLORIDE BLDA-SCNC: 106 MMOL/L (ref 98–107)
CHLORIDE BLDA-SCNC: 107 MMOL/L (ref 98–107)
GLUCOSE BLD MANUAL STRIP-MCNC: 121 MG/DL (ref 74–99)
GLUCOSE BLD MANUAL STRIP-MCNC: 164 MG/DL (ref 74–99)
GLUCOSE BLD MANUAL STRIP-MCNC: 209 MG/DL (ref 74–99)
GLUCOSE BLD MANUAL STRIP-MCNC: 228 MG/DL (ref 74–99)
GLUCOSE BLDA-MCNC: 125 MG/DL (ref 74–99)
GLUCOSE BLDA-MCNC: 167 MG/DL (ref 74–99)
GLUCOSE BLDA-MCNC: 188 MG/DL (ref 74–99)
HCO3 BLDA-SCNC: 21.6 MMOL/L (ref 22–26)
HCO3 BLDA-SCNC: 21.6 MMOL/L (ref 22–26)
HCO3 BLDA-SCNC: 22.5 MMOL/L (ref 22–26)
HCT VFR BLD EST: 30 % (ref 36–46)
HCT VFR BLD EST: 34 % (ref 36–46)
HCT VFR BLD EST: 37 % (ref 36–46)
HGB BLDA-MCNC: 10 G/DL (ref 12–16)
HGB BLDA-MCNC: 11.2 G/DL (ref 12–16)
HGB BLDA-MCNC: 12.2 G/DL (ref 12–16)
INHALED O2 CONCENTRATION: 100 %
INHALED O2 CONCENTRATION: 85 %
INHALED O2 CONCENTRATION: 98 %
LACTATE BLDA-SCNC: 1.1 MMOL/L (ref 0.4–2)
LACTATE BLDA-SCNC: 1.1 MMOL/L (ref 0.4–2)
LACTATE BLDA-SCNC: 1.2 MMOL/L (ref 0.4–2)
OXYHGB MFR BLDA: 96.8 % (ref 94–98)
OXYHGB MFR BLDA: 97 % (ref 94–98)
OXYHGB MFR BLDA: 97.4 % (ref 94–98)
PCO2 BLDA: 42 MM HG (ref 38–42)
PCO2 BLDA: 47 MM HG (ref 38–42)
PCO2 BLDA: 49 MM HG (ref 38–42)
PH BLDA: 7.27 PH (ref 7.38–7.42)
PH BLDA: 7.27 PH (ref 7.38–7.42)
PH BLDA: 7.32 PH (ref 7.38–7.42)
PO2 BLDA: 109 MM HG (ref 85–95)
PO2 BLDA: 124 MM HG (ref 85–95)
PO2 BLDA: 162 MM HG (ref 85–95)
POTASSIUM BLDA-SCNC: 3.7 MMOL/L (ref 3.5–5.3)
POTASSIUM BLDA-SCNC: 4.4 MMOL/L (ref 3.5–5.3)
POTASSIUM BLDA-SCNC: 4.5 MMOL/L (ref 3.5–5.3)
RH FACTOR (ANTIGEN D): NORMAL
SAO2 % BLDA: 100 % (ref 94–100)
SAO2 % BLDA: 99 % (ref 94–100)
SAO2 % BLDA: 99 % (ref 94–100)
SODIUM BLDA-SCNC: 137 MMOL/L (ref 136–145)
SODIUM BLDA-SCNC: 137 MMOL/L (ref 136–145)
SODIUM BLDA-SCNC: 138 MMOL/L (ref 136–145)

## 2024-05-03 PROCEDURE — 2500000005 HC RX 250 GENERAL PHARMACY W/O HCPCS

## 2024-05-03 PROCEDURE — 07T74ZZ RESECTION OF THORAX LYMPHATIC, PERCUTANEOUS ENDOSCOPIC APPROACH: ICD-10-PCS | Performed by: STUDENT IN AN ORGANIZED HEALTH CARE EDUCATION/TRAINING PROGRAM

## 2024-05-03 PROCEDURE — 2500000004 HC RX 250 GENERAL PHARMACY W/ HCPCS (ALT 636 FOR OP/ED): Performed by: PHYSICIAN ASSISTANT

## 2024-05-03 PROCEDURE — 32674 THORACOSCOPY LYMPH NODE EXC: CPT | Performed by: PHYSICIAN ASSISTANT

## 2024-05-03 PROCEDURE — 32674 THORACOSCOPY LYMPH NODE EXC: CPT | Performed by: THORACIC SURGERY (CARDIOTHORACIC VASCULAR SURGERY)

## 2024-05-03 PROCEDURE — 8E0W4CZ ROBOTIC ASSISTED PROCEDURE OF TRUNK REGION, PERCUTANEOUS ENDOSCOPIC APPROACH: ICD-10-PCS | Performed by: STUDENT IN AN ORGANIZED HEALTH CARE EDUCATION/TRAINING PROGRAM

## 2024-05-03 PROCEDURE — 3600000008 HC OR TIME - EACH INCREMENTAL 1 MINUTE - PROCEDURE LEVEL THREE: Performed by: THORACIC SURGERY (CARDIOTHORACIC VASCULAR SURGERY)

## 2024-05-03 PROCEDURE — 2500000001 HC RX 250 WO HCPCS SELF ADMINISTERED DRUGS (ALT 637 FOR MEDICARE OP): Performed by: PHYSICIAN ASSISTANT

## 2024-05-03 PROCEDURE — 2500000004 HC RX 250 GENERAL PHARMACY W/ HCPCS (ALT 636 FOR OP/ED): Performed by: STUDENT IN AN ORGANIZED HEALTH CARE EDUCATION/TRAINING PROGRAM

## 2024-05-03 PROCEDURE — 36620 INSERTION CATHETER ARTERY: CPT

## 2024-05-03 PROCEDURE — 0BJ08ZZ INSPECTION OF TRACHEOBRONCHIAL TREE, VIA NATURAL OR ARTIFICIAL OPENING ENDOSCOPIC: ICD-10-PCS | Performed by: STUDENT IN AN ORGANIZED HEALTH CARE EDUCATION/TRAINING PROGRAM

## 2024-05-03 PROCEDURE — 2500000004 HC RX 250 GENERAL PHARMACY W/ HCPCS (ALT 636 FOR OP/ED)

## 2024-05-03 PROCEDURE — 82805 BLOOD GASES W/O2 SATURATION: CPT | Performed by: STUDENT IN AN ORGANIZED HEALTH CARE EDUCATION/TRAINING PROGRAM

## 2024-05-03 PROCEDURE — 2500000005 HC RX 250 GENERAL PHARMACY W/O HCPCS: Performed by: THORACIC SURGERY (CARDIOTHORACIC VASCULAR SURGERY)

## 2024-05-03 PROCEDURE — 88305 TISSUE EXAM BY PATHOLOGIST: CPT | Performed by: PATHOLOGY

## 2024-05-03 PROCEDURE — 2500000005 HC RX 250 GENERAL PHARMACY W/O HCPCS: Performed by: PHYSICIAN ASSISTANT

## 2024-05-03 PROCEDURE — 88309 TISSUE EXAM BY PATHOLOGIST: CPT | Performed by: PATHOLOGY

## 2024-05-03 PROCEDURE — 84132 ASSAY OF SERUM POTASSIUM: CPT

## 2024-05-03 PROCEDURE — 2780000003 HC OR 278 NO HCPCS: Performed by: THORACIC SURGERY (CARDIOTHORACIC VASCULAR SURGERY)

## 2024-05-03 PROCEDURE — 7100000002 HC RECOVERY ROOM TIME - EACH INCREMENTAL 1 MINUTE: Performed by: THORACIC SURGERY (CARDIOTHORACIC VASCULAR SURGERY)

## 2024-05-03 PROCEDURE — 32663 THORACOSCOPY W/LOBECTOMY: CPT | Performed by: THORACIC SURGERY (CARDIOTHORACIC VASCULAR SURGERY)

## 2024-05-03 PROCEDURE — 7100000001 HC RECOVERY ROOM TIME - INITIAL BASE CHARGE: Performed by: THORACIC SURGERY (CARDIOTHORACIC VASCULAR SURGERY)

## 2024-05-03 PROCEDURE — 2500000006 HC RX 250 W HCPCS SELF ADMINISTERED DRUGS (ALT 637 FOR ALL PAYERS): Performed by: PHYSICIAN ASSISTANT

## 2024-05-03 PROCEDURE — 51701 INSERT BLADDER CATHETER: CPT

## 2024-05-03 PROCEDURE — 71045 X-RAY EXAM CHEST 1 VIEW: CPT

## 2024-05-03 PROCEDURE — 88342 IMHCHEM/IMCYTCHM 1ST ANTB: CPT | Mod: TC,SUR | Performed by: THORACIC SURGERY (CARDIOTHORACIC VASCULAR SURGERY)

## 2024-05-03 PROCEDURE — A32663 PR THORACOSCOPY SURG LOBECTOMY: Performed by: STUDENT IN AN ORGANIZED HEALTH CARE EDUCATION/TRAINING PROGRAM

## 2024-05-03 PROCEDURE — 3700000001 HC GENERAL ANESTHESIA TIME - INITIAL BASE CHARGE: Performed by: THORACIC SURGERY (CARDIOTHORACIC VASCULAR SURGERY)

## 2024-05-03 PROCEDURE — 2500000002 HC RX 250 W HCPCS SELF ADMINISTERED DRUGS (ALT 637 FOR MEDICARE OP, ALT 636 FOR OP/ED): Performed by: PHYSICIAN ASSISTANT

## 2024-05-03 PROCEDURE — 0BBF4ZZ EXCISION OF RIGHT LOWER LUNG LOBE, PERCUTANEOUS ENDOSCOPIC APPROACH: ICD-10-PCS | Performed by: STUDENT IN AN ORGANIZED HEALTH CARE EDUCATION/TRAINING PROGRAM

## 2024-05-03 PROCEDURE — 3700000002 HC GENERAL ANESTHESIA TIME - EACH INCREMENTAL 1 MINUTE: Performed by: THORACIC SURGERY (CARDIOTHORACIC VASCULAR SURGERY)

## 2024-05-03 PROCEDURE — 3600000003 HC OR TIME - INITIAL BASE CHARGE - PROCEDURE LEVEL THREE: Performed by: THORACIC SURGERY (CARDIOTHORACIC VASCULAR SURGERY)

## 2024-05-03 PROCEDURE — 2500000002 HC RX 250 W HCPCS SELF ADMINISTERED DRUGS (ALT 637 FOR MEDICARE OP, ALT 636 FOR OP/ED): Performed by: STUDENT IN AN ORGANIZED HEALTH CARE EDUCATION/TRAINING PROGRAM

## 2024-05-03 PROCEDURE — A4217 STERILE WATER/SALINE, 500 ML: HCPCS | Performed by: THORACIC SURGERY (CARDIOTHORACIC VASCULAR SURGERY)

## 2024-05-03 PROCEDURE — 2500000001 HC RX 250 WO HCPCS SELF ADMINISTERED DRUGS (ALT 637 FOR MEDICARE OP): Performed by: THORACIC SURGERY (CARDIOTHORACIC VASCULAR SURGERY)

## 2024-05-03 PROCEDURE — 71045 X-RAY EXAM CHEST 1 VIEW: CPT | Performed by: RADIOLOGY

## 2024-05-03 PROCEDURE — 82947 ASSAY GLUCOSE BLOOD QUANT: CPT

## 2024-05-03 PROCEDURE — 82810 BLOOD GASES O2 SAT ONLY: CPT | Performed by: STUDENT IN AN ORGANIZED HEALTH CARE EDUCATION/TRAINING PROGRAM

## 2024-05-03 PROCEDURE — 2500000004 HC RX 250 GENERAL PHARMACY W/ HCPCS (ALT 636 FOR OP/ED): Performed by: THORACIC SURGERY (CARDIOTHORACIC VASCULAR SURGERY)

## 2024-05-03 PROCEDURE — 31622 DX BRONCHOSCOPE/WASH: CPT | Performed by: THORACIC SURGERY (CARDIOTHORACIC VASCULAR SURGERY)

## 2024-05-03 PROCEDURE — 1200000002 HC GENERAL ROOM WITH TELEMETRY DAILY

## 2024-05-03 PROCEDURE — 84132 ASSAY OF SERUM POTASSIUM: CPT | Performed by: STUDENT IN AN ORGANIZED HEALTH CARE EDUCATION/TRAINING PROGRAM

## 2024-05-03 PROCEDURE — 32669 THORACOSCOPY REMOVE SEGMENT: CPT | Performed by: PHYSICIAN ASSISTANT

## 2024-05-03 PROCEDURE — 2720000007 HC OR 272 NO HCPCS: Performed by: THORACIC SURGERY (CARDIOTHORACIC VASCULAR SURGERY)

## 2024-05-03 PROCEDURE — 83605 ASSAY OF LACTIC ACID: CPT | Performed by: STUDENT IN AN ORGANIZED HEALTH CARE EDUCATION/TRAINING PROGRAM

## 2024-05-03 PROCEDURE — 36415 COLL VENOUS BLD VENIPUNCTURE: CPT | Performed by: STUDENT IN AN ORGANIZED HEALTH CARE EDUCATION/TRAINING PROGRAM

## 2024-05-03 PROCEDURE — 2500000005 HC RX 250 GENERAL PHARMACY W/O HCPCS: Performed by: STUDENT IN AN ORGANIZED HEALTH CARE EDUCATION/TRAINING PROGRAM

## 2024-05-03 RX ORDER — NALOXONE HYDROCHLORIDE 0.4 MG/ML
0.2 INJECTION, SOLUTION INTRAMUSCULAR; INTRAVENOUS; SUBCUTANEOUS AS NEEDED
Status: DISCONTINUED | OUTPATIENT
Start: 2024-05-03 | End: 2024-05-04 | Stop reason: HOSPADM

## 2024-05-03 RX ORDER — ROCURONIUM BROMIDE 10 MG/ML
INJECTION, SOLUTION INTRAVENOUS
Status: DISCONTINUED
Start: 2024-05-03 | End: 2024-05-04 | Stop reason: HOSPADM

## 2024-05-03 RX ORDER — ALBUTEROL SULFATE 0.83 MG/ML
2.5 SOLUTION RESPIRATORY (INHALATION) ONCE
Status: COMPLETED | OUTPATIENT
Start: 2024-05-03 | End: 2024-05-03

## 2024-05-03 RX ORDER — OCTREOTIDE ACETATE 100 UG/ML
100 INJECTION, SOLUTION INTRAVENOUS; SUBCUTANEOUS ONCE
Status: DISCONTINUED | OUTPATIENT
Start: 2024-05-03 | End: 2024-05-04 | Stop reason: HOSPADM

## 2024-05-03 RX ORDER — ONDANSETRON HYDROCHLORIDE 2 MG/ML
INJECTION, SOLUTION INTRAVENOUS AS NEEDED
Status: DISCONTINUED | OUTPATIENT
Start: 2024-05-03 | End: 2024-05-03

## 2024-05-03 RX ORDER — NITROGLYCERIN 40 MG/100ML
INJECTION INTRAVENOUS AS NEEDED
Status: DISCONTINUED | OUTPATIENT
Start: 2024-05-03 | End: 2024-05-03

## 2024-05-03 RX ORDER — DOCUSATE SODIUM 100 MG/1
100 CAPSULE, LIQUID FILLED ORAL 2 TIMES DAILY
Status: DISCONTINUED | OUTPATIENT
Start: 2024-05-03 | End: 2024-05-04 | Stop reason: HOSPADM

## 2024-05-03 RX ORDER — SODIUM CHLORIDE, SODIUM LACTATE, POTASSIUM CHLORIDE, CALCIUM CHLORIDE 600; 310; 30; 20 MG/100ML; MG/100ML; MG/100ML; MG/100ML
100 INJECTION, SOLUTION INTRAVENOUS CONTINUOUS
Status: DISCONTINUED | OUTPATIENT
Start: 2024-05-03 | End: 2024-05-03 | Stop reason: HOSPADM

## 2024-05-03 RX ORDER — ALBUTEROL SULFATE 0.83 MG/ML
2.5 SOLUTION RESPIRATORY (INHALATION) ONCE AS NEEDED
Status: DISCONTINUED | OUTPATIENT
Start: 2024-05-03 | End: 2024-05-03 | Stop reason: HOSPADM

## 2024-05-03 RX ORDER — PANTOPRAZOLE SODIUM 40 MG/1
40 TABLET, DELAYED RELEASE ORAL
Status: DISCONTINUED | OUTPATIENT
Start: 2024-05-04 | End: 2024-05-04 | Stop reason: HOSPADM

## 2024-05-03 RX ORDER — ONDANSETRON 4 MG/1
4 TABLET, ORALLY DISINTEGRATING ORAL EVERY 8 HOURS PRN
Status: DISCONTINUED | OUTPATIENT
Start: 2024-05-03 | End: 2024-05-04 | Stop reason: HOSPADM

## 2024-05-03 RX ORDER — METOPROLOL TARTRATE 1 MG/ML
INJECTION, SOLUTION INTRAVENOUS AS NEEDED
Status: DISCONTINUED | OUTPATIENT
Start: 2024-05-03 | End: 2024-05-03

## 2024-05-03 RX ORDER — METHOCARBAMOL 100 MG/ML
1000 INJECTION, SOLUTION INTRAMUSCULAR; INTRAVENOUS ONCE
Status: COMPLETED | OUTPATIENT
Start: 2024-05-03 | End: 2024-05-03

## 2024-05-03 RX ORDER — ESMOLOL HYDROCHLORIDE 10 MG/ML
INJECTION INTRAVENOUS
Status: COMPLETED
Start: 2024-05-03 | End: 2024-05-03

## 2024-05-03 RX ORDER — SODIUM CHLORIDE 0.9 G/100ML
IRRIGANT IRRIGATION AS NEEDED
Status: DISCONTINUED | OUTPATIENT
Start: 2024-05-03 | End: 2024-05-03 | Stop reason: HOSPADM

## 2024-05-03 RX ORDER — HYDROMORPHONE HYDROCHLORIDE 1 MG/ML
0.2 INJECTION, SOLUTION INTRAMUSCULAR; INTRAVENOUS; SUBCUTANEOUS EVERY 5 MIN PRN
Status: DISCONTINUED | OUTPATIENT
Start: 2024-05-03 | End: 2024-05-04

## 2024-05-03 RX ORDER — FENTANYL CITRATE 50 UG/ML
INJECTION, SOLUTION INTRAMUSCULAR; INTRAVENOUS AS NEEDED
Status: DISCONTINUED | OUTPATIENT
Start: 2024-05-03 | End: 2024-05-03

## 2024-05-03 RX ORDER — PROPOFOL 10 MG/ML
INJECTION, EMULSION INTRAVENOUS
Status: COMPLETED
Start: 2024-05-03 | End: 2024-05-03

## 2024-05-03 RX ORDER — LABETALOL HYDROCHLORIDE 5 MG/ML
5 INJECTION, SOLUTION INTRAVENOUS ONCE AS NEEDED
Status: DISCONTINUED | OUTPATIENT
Start: 2024-05-03 | End: 2024-05-03 | Stop reason: HOSPADM

## 2024-05-03 RX ORDER — CEFAZOLIN SODIUM 1 G/50ML
1 SOLUTION INTRAVENOUS EVERY 8 HOURS
Status: COMPLETED | OUTPATIENT
Start: 2024-05-03 | End: 2024-05-04

## 2024-05-03 RX ORDER — NITROGLYCERIN 20 MG/100ML
INJECTION INTRAVENOUS
Status: DISCONTINUED
Start: 2024-05-03 | End: 2024-05-04 | Stop reason: HOSPADM

## 2024-05-03 RX ORDER — LIDOCAINE HCL/PF 100 MG/5ML
SYRINGE (ML) INTRAVENOUS
Status: DISCONTINUED
Start: 2024-05-03 | End: 2024-05-04 | Stop reason: HOSPADM

## 2024-05-03 RX ORDER — ALBUTEROL SULFATE 0.83 MG/ML
2.5 SOLUTION RESPIRATORY (INHALATION) ONCE AS NEEDED
Status: DISCONTINUED | OUTPATIENT
Start: 2024-05-03 | End: 2024-05-04 | Stop reason: HOSPADM

## 2024-05-03 RX ORDER — HYDRALAZINE HYDROCHLORIDE 20 MG/ML
5 INJECTION INTRAMUSCULAR; INTRAVENOUS EVERY 30 MIN PRN
Status: DISCONTINUED | OUTPATIENT
Start: 2024-05-03 | End: 2024-05-04 | Stop reason: HOSPADM

## 2024-05-03 RX ORDER — LABETALOL HYDROCHLORIDE 5 MG/ML
5 INJECTION, SOLUTION INTRAVENOUS ONCE AS NEEDED
Status: DISCONTINUED | OUTPATIENT
Start: 2024-05-03 | End: 2024-05-04 | Stop reason: HOSPADM

## 2024-05-03 RX ORDER — METOPROLOL TARTRATE 1 MG/ML
5 INJECTION, SOLUTION INTRAVENOUS
Status: DISCONTINUED | OUTPATIENT
Start: 2024-05-03 | End: 2024-05-04 | Stop reason: HOSPADM

## 2024-05-03 RX ORDER — LIDOCAINE HYDROCHLORIDE 10 MG/ML
0.1 INJECTION INFILTRATION; PERINEURAL ONCE
Status: DISCONTINUED | OUTPATIENT
Start: 2024-05-03 | End: 2024-05-04 | Stop reason: HOSPADM

## 2024-05-03 RX ORDER — OXYBUTYNIN CHLORIDE 5 MG/1
5 TABLET ORAL 2 TIMES DAILY
Status: DISCONTINUED | OUTPATIENT
Start: 2024-05-03 | End: 2024-05-04 | Stop reason: HOSPADM

## 2024-05-03 RX ORDER — LIDOCAINE HYDROCHLORIDE 20 MG/ML
INJECTION, SOLUTION INFILTRATION; PERINEURAL AS NEEDED
Status: DISCONTINUED | OUTPATIENT
Start: 2024-05-03 | End: 2024-05-03

## 2024-05-03 RX ORDER — ONDANSETRON HYDROCHLORIDE 2 MG/ML
4 INJECTION, SOLUTION INTRAVENOUS EVERY 8 HOURS PRN
Status: DISCONTINUED | OUTPATIENT
Start: 2024-05-03 | End: 2024-05-04 | Stop reason: HOSPADM

## 2024-05-03 RX ORDER — HEPARIN SODIUM 5000 [USP'U]/ML
5000 INJECTION, SOLUTION INTRAVENOUS; SUBCUTANEOUS ONCE
Status: COMPLETED | OUTPATIENT
Start: 2024-05-03 | End: 2024-05-03

## 2024-05-03 RX ORDER — HYDROMORPHONE HYDROCHLORIDE 1 MG/ML
INJECTION, SOLUTION INTRAMUSCULAR; INTRAVENOUS; SUBCUTANEOUS AS NEEDED
Status: DISCONTINUED | OUTPATIENT
Start: 2024-05-03 | End: 2024-05-03

## 2024-05-03 RX ORDER — ROCURONIUM BROMIDE 10 MG/ML
INJECTION, SOLUTION INTRAVENOUS AS NEEDED
Status: DISCONTINUED | OUTPATIENT
Start: 2024-05-03 | End: 2024-05-03

## 2024-05-03 RX ORDER — ACETAMINOPHEN 325 MG/1
650 TABLET ORAL EVERY 4 HOURS PRN
Status: DISCONTINUED | OUTPATIENT
Start: 2024-05-03 | End: 2024-05-04 | Stop reason: HOSPADM

## 2024-05-03 RX ORDER — ALBUTEROL SULFATE 0.83 MG/ML
2.5 SOLUTION RESPIRATORY (INHALATION) 4 TIMES DAILY PRN
Status: DISCONTINUED | OUTPATIENT
Start: 2024-05-03 | End: 2024-05-04 | Stop reason: HOSPADM

## 2024-05-03 RX ORDER — HYDROMORPHONE HCL/0.9% NACL/PF 15 MG/30ML
PATIENT CONTROLLED ANALGESIA SYRINGE INTRAVENOUS CONTINUOUS
Status: DISCONTINUED | OUTPATIENT
Start: 2024-05-03 | End: 2024-05-04

## 2024-05-03 RX ORDER — CEFAZOLIN SODIUM 2 G/100ML
2 INJECTION, SOLUTION INTRAVENOUS ONCE
Status: DISCONTINUED | OUTPATIENT
Start: 2024-05-03 | End: 2024-05-03 | Stop reason: HOSPADM

## 2024-05-03 RX ORDER — ACETAMINOPHEN 325 MG/1
650 TABLET ORAL ONCE
Status: COMPLETED | OUTPATIENT
Start: 2024-05-03 | End: 2024-05-03

## 2024-05-03 RX ORDER — ESMOLOL HYDROCHLORIDE 10 MG/ML
INJECTION INTRAVENOUS AS NEEDED
Status: DISCONTINUED | OUTPATIENT
Start: 2024-05-03 | End: 2024-05-03

## 2024-05-03 RX ORDER — ACETAMINOPHEN 325 MG/1
650 TABLET ORAL EVERY 4 HOURS PRN
Status: DISCONTINUED | OUTPATIENT
Start: 2024-05-03 | End: 2024-05-03 | Stop reason: HOSPADM

## 2024-05-03 RX ORDER — BUPIVACAINE HCL/EPINEPHRINE 0.25-.0005
VIAL (ML) INJECTION AS NEEDED
Status: DISCONTINUED | OUTPATIENT
Start: 2024-05-03 | End: 2024-05-03 | Stop reason: HOSPADM

## 2024-05-03 RX ORDER — HYDROMORPHONE HCL/0.9% NACL/PF 15 MG/30ML
PATIENT CONTROLLED ANALGESIA SYRINGE INTRAVENOUS CONTINUOUS
Status: DISCONTINUED | OUTPATIENT
Start: 2024-05-03 | End: 2024-05-03

## 2024-05-03 RX ORDER — MIDAZOLAM HYDROCHLORIDE 1 MG/ML
INJECTION INTRAMUSCULAR; INTRAVENOUS
Status: DISCONTINUED
Start: 2024-05-03 | End: 2024-05-03 | Stop reason: WASHOUT

## 2024-05-03 RX ORDER — LIDOCAINE HYDROCHLORIDE 10 MG/ML
0.1 INJECTION INFILTRATION; PERINEURAL ONCE
Status: DISCONTINUED | OUTPATIENT
Start: 2024-05-03 | End: 2024-05-03 | Stop reason: HOSPADM

## 2024-05-03 RX ORDER — DIPHENHYDRAMINE HYDROCHLORIDE 50 MG/ML
INJECTION INTRAMUSCULAR; INTRAVENOUS
Status: DISCONTINUED
Start: 2024-05-03 | End: 2024-05-04 | Stop reason: HOSPADM

## 2024-05-03 RX ORDER — PROPOFOL 10 MG/ML
INJECTION, EMULSION INTRAVENOUS CONTINUOUS PRN
Status: DISCONTINUED | OUTPATIENT
Start: 2024-05-03 | End: 2024-05-03

## 2024-05-03 RX ORDER — HYDROMORPHONE HYDROCHLORIDE 1 MG/ML
0.2 INJECTION, SOLUTION INTRAMUSCULAR; INTRAVENOUS; SUBCUTANEOUS EVERY 5 MIN PRN
Status: DISCONTINUED | OUTPATIENT
Start: 2024-05-03 | End: 2024-05-03 | Stop reason: HOSPADM

## 2024-05-03 RX ORDER — OXYCODONE HYDROCHLORIDE 5 MG/1
2.5 TABLET ORAL EVERY 4 HOURS PRN
Status: DISCONTINUED | OUTPATIENT
Start: 2024-05-03 | End: 2024-05-04 | Stop reason: HOSPADM

## 2024-05-03 RX ORDER — DEXTROSE 50 % IN WATER (D50W) INTRAVENOUS SYRINGE
25
Status: DISCONTINUED | OUTPATIENT
Start: 2024-05-03 | End: 2024-05-04 | Stop reason: HOSPADM

## 2024-05-03 RX ORDER — CEFAZOLIN 1 G/1
INJECTION, POWDER, FOR SOLUTION INTRAVENOUS
Status: DISCONTINUED
Start: 2024-05-03 | End: 2024-05-04 | Stop reason: HOSPADM

## 2024-05-03 RX ORDER — METOPROLOL TARTRATE 1 MG/ML
INJECTION, SOLUTION INTRAVENOUS
Status: COMPLETED
Start: 2024-05-03 | End: 2024-05-03

## 2024-05-03 RX ORDER — HYDROMORPHONE HYDROCHLORIDE 1 MG/ML
INJECTION, SOLUTION INTRAMUSCULAR; INTRAVENOUS; SUBCUTANEOUS
Status: COMPLETED
Start: 2024-05-03 | End: 2024-05-03

## 2024-05-03 RX ORDER — HYDROMORPHONE HYDROCHLORIDE 1 MG/ML
0.5 INJECTION, SOLUTION INTRAMUSCULAR; INTRAVENOUS; SUBCUTANEOUS EVERY 5 MIN PRN
Status: DISCONTINUED | OUTPATIENT
Start: 2024-05-03 | End: 2024-05-03 | Stop reason: HOSPADM

## 2024-05-03 RX ORDER — OXYCODONE HYDROCHLORIDE 5 MG/1
5 TABLET ORAL EVERY 4 HOURS PRN
Status: DISCONTINUED | OUTPATIENT
Start: 2024-05-03 | End: 2024-05-03 | Stop reason: HOSPADM

## 2024-05-03 RX ORDER — OXYCODONE HYDROCHLORIDE 5 MG/1
5 TABLET ORAL EVERY 4 HOURS PRN
Status: DISCONTINUED | OUTPATIENT
Start: 2024-05-03 | End: 2024-05-04 | Stop reason: HOSPADM

## 2024-05-03 RX ORDER — ONDANSETRON HYDROCHLORIDE 2 MG/ML
4 INJECTION, SOLUTION INTRAVENOUS ONCE AS NEEDED
Status: DISCONTINUED | OUTPATIENT
Start: 2024-05-03 | End: 2024-05-03 | Stop reason: HOSPADM

## 2024-05-03 RX ORDER — FENTANYL CITRATE 50 UG/ML
INJECTION, SOLUTION INTRAMUSCULAR; INTRAVENOUS
Status: COMPLETED
Start: 2024-05-03 | End: 2024-05-03

## 2024-05-03 RX ORDER — ONDANSETRON HYDROCHLORIDE 2 MG/ML
INJECTION, SOLUTION INTRAVENOUS
Status: COMPLETED
Start: 2024-05-03 | End: 2024-05-03

## 2024-05-03 RX ORDER — PROPOFOL 10 MG/ML
INJECTION, EMULSION INTRAVENOUS AS NEEDED
Status: DISCONTINUED | OUTPATIENT
Start: 2024-05-03 | End: 2024-05-03

## 2024-05-03 RX ORDER — FORMOTEROL FUMARATE DIHYDRATE 20 UG/2ML
20 SOLUTION RESPIRATORY (INHALATION)
Status: DISCONTINUED | OUTPATIENT
Start: 2024-05-03 | End: 2024-05-04 | Stop reason: HOSPADM

## 2024-05-03 RX ORDER — SODIUM CHLORIDE, SODIUM LACTATE, POTASSIUM CHLORIDE, CALCIUM CHLORIDE 600; 310; 30; 20 MG/100ML; MG/100ML; MG/100ML; MG/100ML
50 INJECTION, SOLUTION INTRAVENOUS CONTINUOUS
Status: DISCONTINUED | OUTPATIENT
Start: 2024-05-03 | End: 2024-05-04

## 2024-05-03 RX ORDER — PROPOFOL 10 MG/ML
INJECTION, EMULSION INTRAVENOUS
Status: DISCONTINUED
Start: 2024-05-03 | End: 2024-05-04 | Stop reason: HOSPADM

## 2024-05-03 RX ORDER — OXYCODONE HYDROCHLORIDE 5 MG/1
10 TABLET ORAL EVERY 4 HOURS PRN
Status: DISCONTINUED | OUTPATIENT
Start: 2024-05-03 | End: 2024-05-03 | Stop reason: HOSPADM

## 2024-05-03 RX ORDER — LEVOTHYROXINE SODIUM 25 UG/1
25 TABLET ORAL DAILY
Status: DISCONTINUED | OUTPATIENT
Start: 2024-05-03 | End: 2024-05-04 | Stop reason: HOSPADM

## 2024-05-03 RX ORDER — HEPARIN SODIUM 5000 [USP'U]/ML
5000 INJECTION, SOLUTION INTRAVENOUS; SUBCUTANEOUS EVERY 8 HOURS
Status: DISCONTINUED | OUTPATIENT
Start: 2024-05-03 | End: 2024-05-04 | Stop reason: HOSPADM

## 2024-05-03 RX ORDER — CEFAZOLIN 1 G/1
INJECTION, POWDER, FOR SOLUTION INTRAVENOUS AS NEEDED
Status: DISCONTINUED | OUTPATIENT
Start: 2024-05-03 | End: 2024-05-03

## 2024-05-03 RX ORDER — GABAPENTIN 300 MG/1
300 CAPSULE ORAL ONCE
Status: COMPLETED | OUTPATIENT
Start: 2024-05-03 | End: 2024-05-03

## 2024-05-03 RX ORDER — HYDRALAZINE HYDROCHLORIDE 20 MG/ML
5 INJECTION INTRAMUSCULAR; INTRAVENOUS EVERY 30 MIN PRN
Status: DISCONTINUED | OUTPATIENT
Start: 2024-05-03 | End: 2024-05-03 | Stop reason: HOSPADM

## 2024-05-03 RX ORDER — METOCLOPRAMIDE HYDROCHLORIDE 5 MG/ML
10 INJECTION INTRAMUSCULAR; INTRAVENOUS ONCE AS NEEDED
Status: DISCONTINUED | OUTPATIENT
Start: 2024-05-03 | End: 2024-05-04 | Stop reason: HOSPADM

## 2024-05-03 RX ORDER — ONDANSETRON HYDROCHLORIDE 2 MG/ML
4 INJECTION, SOLUTION INTRAVENOUS ONCE AS NEEDED
Status: DISCONTINUED | OUTPATIENT
Start: 2024-05-03 | End: 2024-05-04 | Stop reason: HOSPADM

## 2024-05-03 RX ORDER — DEXTROSE 50 % IN WATER (D50W) INTRAVENOUS SYRINGE
12.5
Status: DISCONTINUED | OUTPATIENT
Start: 2024-05-03 | End: 2024-05-04 | Stop reason: HOSPADM

## 2024-05-03 RX ADMIN — ESMOLOL HYDROCHLORIDE 30 MG: 10 INJECTION, SOLUTION INTRAVENOUS at 08:40

## 2024-05-03 RX ADMIN — ALBUTEROL SULFATE 2.5 MG: 2.5 SOLUTION RESPIRATORY (INHALATION) at 07:18

## 2024-05-03 RX ADMIN — CEFAZOLIN 2 G: 1 INJECTION, POWDER, FOR SOLUTION INTRAMUSCULAR; INTRAVENOUS at 08:38

## 2024-05-03 RX ADMIN — PROPOFOL 50 MG: 10 INJECTION, EMULSION INTRAVENOUS at 08:02

## 2024-05-03 RX ADMIN — SODIUM CHLORIDE, POTASSIUM CHLORIDE, SODIUM LACTATE AND CALCIUM CHLORIDE 100 ML/HR: 600; 310; 30; 20 INJECTION, SOLUTION INTRAVENOUS at 20:47

## 2024-05-03 RX ADMIN — HYDROMORPHONE HYDROCHLORIDE 0.5 MG: 1 INJECTION, SOLUTION INTRAMUSCULAR; INTRAVENOUS; SUBCUTANEOUS at 11:58

## 2024-05-03 RX ADMIN — PROPOFOL 25 MCG/KG/MIN: 10 INJECTION, EMULSION INTRAVENOUS at 08:54

## 2024-05-03 RX ADMIN — ACETAMINOPHEN 650 MG: 325 TABLET ORAL at 07:09

## 2024-05-03 RX ADMIN — GABAPENTIN 300 MG: 300 CAPSULE ORAL at 07:10

## 2024-05-03 RX ADMIN — METOPROLOL TARTRATE 5 MG: 1 INJECTION, SOLUTION INTRAVENOUS at 18:41

## 2024-05-03 RX ADMIN — ROCURONIUM 10 MG: 50 INJECTION, SOLUTION INTRAVENOUS at 09:06

## 2024-05-03 RX ADMIN — NITROGLYCERIN 100 MCG: 10 INJECTION INTRAVENOUS at 08:40

## 2024-05-03 RX ADMIN — SUGAMMADEX 200 MG: 100 INJECTION, SOLUTION INTRAVENOUS at 11:46

## 2024-05-03 RX ADMIN — METHOCARBAMOL 1000 MG: 100 INJECTION INTRAMUSCULAR; INTRAVENOUS at 12:35

## 2024-05-03 RX ADMIN — ESMOLOL HYDROCHLORIDE 50 MG: 10 INJECTION, SOLUTION INTRAVENOUS at 08:00

## 2024-05-03 RX ADMIN — Medication: at 13:20

## 2024-05-03 RX ADMIN — SODIUM CHLORIDE, SODIUM LACTATE, POTASSIUM CHLORIDE, AND CALCIUM CHLORIDE: 600; 310; 30; 20 INJECTION, SOLUTION INTRAVENOUS at 07:54

## 2024-05-03 RX ADMIN — HEPARIN SODIUM 5000 UNITS: 5000 INJECTION INTRAVENOUS; SUBCUTANEOUS at 18:48

## 2024-05-03 RX ADMIN — ROCURONIUM 20 MG: 50 INJECTION, SOLUTION INTRAVENOUS at 08:59

## 2024-05-03 RX ADMIN — ACETAMINOPHEN 650 MG: 325 TABLET ORAL at 20:43

## 2024-05-03 RX ADMIN — ROCURONIUM 50 MG: 50 INJECTION, SOLUTION INTRAVENOUS at 07:55

## 2024-05-03 RX ADMIN — FENTANYL CITRATE 50 MCG: 50 INJECTION, SOLUTION INTRAMUSCULAR; INTRAVENOUS at 08:02

## 2024-05-03 RX ADMIN — DOCUSATE SODIUM 100 MG: 100 CAPSULE, LIQUID FILLED ORAL at 20:46

## 2024-05-03 RX ADMIN — Medication: at 16:37

## 2024-05-03 RX ADMIN — METOPROLOL TARTRATE 5 MG: 5 INJECTION, SOLUTION INTRAVENOUS at 09:29

## 2024-05-03 RX ADMIN — FENTANYL CITRATE 50 MCG: 50 INJECTION, SOLUTION INTRAMUSCULAR; INTRAVENOUS at 07:54

## 2024-05-03 RX ADMIN — OXYBUTYNIN CHLORIDE 5 MG: 5 TABLET ORAL at 20:46

## 2024-05-03 RX ADMIN — PROPOFOL 100 MG: 10 INJECTION, EMULSION INTRAVENOUS at 08:59

## 2024-05-03 RX ADMIN — ESMOLOL HYDROCHLORIDE 50 MG: 10 INJECTION, SOLUTION INTRAVENOUS at 08:02

## 2024-05-03 RX ADMIN — HYDROMORPHONE HYDROCHLORIDE 0.5 MG: 1 INJECTION, SOLUTION INTRAMUSCULAR; INTRAVENOUS; SUBCUTANEOUS at 11:22

## 2024-05-03 RX ADMIN — INSULIN HUMAN 2 UNITS: 100 INJECTION, SOLUTION PARENTERAL at 18:42

## 2024-05-03 RX ADMIN — HEPARIN SODIUM 5000 UNITS: 5000 INJECTION INTRAVENOUS; SUBCUTANEOUS at 07:11

## 2024-05-03 RX ADMIN — PROPOFOL 30 MG: 10 INJECTION, EMULSION INTRAVENOUS at 09:22

## 2024-05-03 RX ADMIN — CEFAZOLIN SODIUM 1 G: 1 INJECTION, SOLUTION INTRAVENOUS at 20:39

## 2024-05-03 RX ADMIN — LIDOCAINE HYDROCHLORIDE 80 MG: 20 INJECTION, SOLUTION INFILTRATION; PERINEURAL at 07:54

## 2024-05-03 RX ADMIN — Medication 3 L/MIN: at 18:15

## 2024-05-03 RX ADMIN — PROPOFOL 150 MG: 10 INJECTION, EMULSION INTRAVENOUS at 07:55

## 2024-05-03 RX ADMIN — NITROGLYCERIN 50 MCG: 10 INJECTION INTRAVENOUS at 08:02

## 2024-05-03 RX ADMIN — ESMOLOL HYDROCHLORIDE 30 MG: 10 INJECTION, SOLUTION INTRAVENOUS at 11:46

## 2024-05-03 RX ADMIN — ONDANSETRON 4 MG: 2 INJECTION, SOLUTION INTRAMUSCULAR; INTRAVENOUS at 11:29

## 2024-05-03 RX ADMIN — OCTREOTIDE ACETATE 50 MCG/HR: 500 INJECTION, SOLUTION INTRAVENOUS; SUBCUTANEOUS at 08:53

## 2024-05-03 SDOH — SOCIAL STABILITY: SOCIAL INSECURITY: DO YOU FEEL ANYONE HAS EXPLOITED OR TAKEN ADVANTAGE OF YOU FINANCIALLY OR OF YOUR PERSONAL PROPERTY?: NO

## 2024-05-03 SDOH — SOCIAL STABILITY: SOCIAL INSECURITY: ABUSE: ADULT

## 2024-05-03 SDOH — SOCIAL STABILITY: SOCIAL INSECURITY: HAS ANYONE EVER THREATENED TO HURT YOUR FAMILY OR YOUR PETS?: NO

## 2024-05-03 SDOH — SOCIAL STABILITY: SOCIAL INSECURITY: ARE THERE ANY APPARENT SIGNS OF INJURIES/BEHAVIORS THAT COULD BE RELATED TO ABUSE/NEGLECT?: NO

## 2024-05-03 SDOH — HEALTH STABILITY: MENTAL HEALTH: CURRENT SMOKER: 0

## 2024-05-03 SDOH — SOCIAL STABILITY: SOCIAL INSECURITY: DOES ANYONE TRY TO KEEP YOU FROM HAVING/CONTACTING OTHER FRIENDS OR DOING THINGS OUTSIDE YOUR HOME?: NO

## 2024-05-03 SDOH — SOCIAL STABILITY: SOCIAL INSECURITY: ARE YOU OR HAVE YOU BEEN THREATENED OR ABUSED PHYSICALLY, EMOTIONALLY, OR SEXUALLY BY ANYONE?: NO

## 2024-05-03 SDOH — SOCIAL STABILITY: SOCIAL INSECURITY: HAVE YOU HAD THOUGHTS OF HARMING ANYONE ELSE?: NO

## 2024-05-03 SDOH — SOCIAL STABILITY: SOCIAL INSECURITY: DO YOU FEEL UNSAFE GOING BACK TO THE PLACE WHERE YOU ARE LIVING?: NO

## 2024-05-03 SDOH — SOCIAL STABILITY: SOCIAL INSECURITY: WERE YOU ABLE TO COMPLETE ALL THE BEHAVIORAL HEALTH SCREENINGS?: YES

## 2024-05-03 ASSESSMENT — PAIN - FUNCTIONAL ASSESSMENT

## 2024-05-03 ASSESSMENT — ACTIVITIES OF DAILY LIVING (ADL)
JUDGMENT_ADEQUATE_SAFELY_COMPLETE_DAILY_ACTIVITIES: YES
ADEQUATE_TO_COMPLETE_ADL: YES
HEARING - RIGHT EAR: FUNCTIONAL
DRESSING YOURSELF: INDEPENDENT
FEEDING YOURSELF: INDEPENDENT
LACK_OF_TRANSPORTATION: NO
TOILETING: INDEPENDENT
WALKS IN HOME: INDEPENDENT
BATHING: INDEPENDENT
HEARING - LEFT EAR: FUNCTIONAL
PATIENT'S MEMORY ADEQUATE TO SAFELY COMPLETE DAILY ACTIVITIES?: YES
GROOMING: INDEPENDENT

## 2024-05-03 ASSESSMENT — LIFESTYLE VARIABLES
AUDIT-C TOTAL SCORE: 0
AUDIT-C TOTAL SCORE: 0
HOW MANY STANDARD DRINKS CONTAINING ALCOHOL DO YOU HAVE ON A TYPICAL DAY: PATIENT DOES NOT DRINK
HOW OFTEN DO YOU HAVE 6 OR MORE DRINKS ON ONE OCCASION: NEVER
HOW OFTEN DO YOU HAVE A DRINK CONTAINING ALCOHOL: NEVER
SKIP TO QUESTIONS 9-10: 1

## 2024-05-03 ASSESSMENT — PAIN SCALES - GENERAL
PAINLEVEL_OUTOF10: 1
PAINLEVEL_OUTOF10: 0 - NO PAIN
PAINLEVEL_OUTOF10: 8
PAINLEVEL_OUTOF10: 0 - NO PAIN
PAINLEVEL_OUTOF10: 10 - WORST POSSIBLE PAIN
PAINLEVEL_OUTOF10: 0 - NO PAIN
PAINLEVEL_OUTOF10: 10 - WORST POSSIBLE PAIN
PAINLEVEL_OUTOF10: 0 - NO PAIN
PAINLEVEL_OUTOF10: 3
PAINLEVEL_OUTOF10: 9
PAINLEVEL_OUTOF10: 10 - WORST POSSIBLE PAIN
PAINLEVEL_OUTOF10: 0 - NO PAIN

## 2024-05-03 ASSESSMENT — COGNITIVE AND FUNCTIONAL STATUS - GENERAL
DRESSING REGULAR LOWER BODY CLOTHING: A LITTLE
HELP NEEDED FOR BATHING: A LITTLE
MOBILITY SCORE: 18
WALKING IN HOSPITAL ROOM: A LITTLE
STANDING UP FROM CHAIR USING ARMS: A LITTLE
MOVING TO AND FROM BED TO CHAIR: A LITTLE
DAILY ACTIVITIY SCORE: 20
CLIMB 3 TO 5 STEPS WITH RAILING: A LITTLE
DRESSING REGULAR UPPER BODY CLOTHING: A LITTLE
TOILETING: A LITTLE
TURNING FROM BACK TO SIDE WHILE IN FLAT BAD: A LITTLE
MOVING FROM LYING ON BACK TO SITTING ON SIDE OF FLAT BED WITH BEDRAILS: A LITTLE
PATIENT BASELINE BEDBOUND: NO

## 2024-05-03 ASSESSMENT — COLUMBIA-SUICIDE SEVERITY RATING SCALE - C-SSRS
6. HAVE YOU EVER DONE ANYTHING, STARTED TO DO ANYTHING, OR PREPARED TO DO ANYTHING TO END YOUR LIFE?: NO
1. IN THE PAST MONTH, HAVE YOU WISHED YOU WERE DEAD OR WISHED YOU COULD GO TO SLEEP AND NOT WAKE UP?: NO
2. HAVE YOU ACTUALLY HAD ANY THOUGHTS OF KILLING YOURSELF?: NO

## 2024-05-03 ASSESSMENT — PATIENT HEALTH QUESTIONNAIRE - PHQ9
2. FEELING DOWN, DEPRESSED OR HOPELESS: NOT AT ALL
SUM OF ALL RESPONSES TO PHQ9 QUESTIONS 1 & 2: 0
1. LITTLE INTEREST OR PLEASURE IN DOING THINGS: NOT AT ALL

## 2024-05-03 ASSESSMENT — PAIN DESCRIPTION - DESCRIPTORS: DESCRIPTORS: SORE

## 2024-05-03 NOTE — BRIEF OP NOTE
Date: 5/3/2024  OR Location: [facility] OR    Name: Comfort Bliss, : 1960, Age: 64 y.o., MRN: [id number], Sex: female    Diagnosis  Pre-op Diagnosis     * Carcinoid tumor determined by biopsy of lung (CMS-HCC) [D3A.090] Post-op Diagnosis     * Carcinoid tumor determined by biopsy of lung (CMS-[facility]) [D3A.090]     Procedures  Thoracoscopic/ROBOTIC lung segmental resection Resection Robot-Assisted Lung Wedge [id number], Thoracoscopy Robot-Assisted [id number]  [id number] - IL THORACOSCOPY W/LOBECTOMY SINGLE LOBE      Surgeons      * Sameer Tate - Primary    Resident/Fellow/Other Assistant:  Surgeons and Role:     * Yudy Yoon MD - Fellow    Procedure Summary  Anesthesia: General  ASA: III  Anesthesia Staff: Anesthesiologist: Afua Dickey MD  Anesthesia Resident: Amaya Tinsley MD  Estimated Blood Loss: 100 mL  Intra-op Medications:   Administrations occurring from 0715 to 1115 on 24:   Medication Name Total Dose   sodium chloride 0.9 % irrigation solution 1,000 mL   octreotide (SandoSTATIN) 500 mcg in sodium chloride 0.9% 100 mL (5 mcg/mL) infusion 22.5 mcg   albuterol 2.5 mg /3 mL (0.083 %) nebulizer solution 2.5 mg 2.5 mg   fentaNYL PF (Sublimaze) injection  - Omnicell Override Pull Cannot be calculated   propofol (Diprivan) injection  - Omnicell Override Pull Cannot be calculated              Anesthesia Record               Intraprocedure I/O Totals          Intake    Octreotide Drip 0.00 mL    The total shown is the total volume documented since Anesthesia Start was filed.    Propofol Drip 0.00 mL    The total shown is the total volume documented since Anesthesia Start was filed.    Total Intake 0 mL       Output    Est. Blood Loss 100 mL    Total Output 100 mL       Net    Net Volume -100 mL          Specimen:   ID Type Source Tests Collected by Time   1 : LEVEL 8 LYMPH NODE Tissue LYMPH NODE PULMONARY (SPECIFY SITE) SURGICAL PATHOLOGY EXAM Sameer Tate MD 5/3/2024 0949   2 : LEVEL 9  LYMPH NODE Tissue LYMPH NODE PULMONARY (SPECIFY SITE) SURGICAL PATHOLOGY EXAM Sameer Tate MD 5/3/2024 0952   3 : LEVEL 7 LYMPH NODE Tissue LYMPH NODE PULMONARY (SPECIFY SITE) SURGICAL PATHOLOGY EXAM Sameer Tate MD 5/3/2024 1007   4 : LEVEL 11 LYMPH NODE Tissue LYMPH NODE PULMONARY (SPECIFY SITE) SURGICAL PATHOLOGY EXAM Sameer Tate MD 5/3/2024 1007   5 : LEVEL 12 LYMPH NODE Tissue LYMPH NODE PULMONARY (SPECIFY SITE) SURGICAL PATHOLOGY EXAM Sameer Tate MD 5/3/2024 1049   6 : RIGHT LOWER LOBE SUPERIOR SEGMENT Tissue LUNG LOBECTOMY/SEGMENTECTOMY RIGHT (SPECIFY SITE) SURGICAL PATHOLOGY EXAM Sameer Tate MD 5/3/2024 1123        Staff:   Circulator: Essie Nguyen, STEVEN; Jacklyn Milian, STEVEN; Jr Nance, RN  Relief Scrub: Andre Chan  Scrub Person: Trenton Martinez          Findings: Robotic assisted RLL superior segmentectomy, MLND.  First robotic port transversed through the diaphragm into the abdomen.  A small 1 cm liver injury was packed with Surgicel, confirmed hemostasis.  Diaphragm closed with Vloc suture    Complications:  None; patient tolerated the procedure well.     Disposition: PACU - hemodynamically stable.  Condition: stable  Specimens Collected:   ID Type Source Tests Collected by Time   1 : LEVEL 8 LYMPH NODE Tissue LYMPH NODE PULMONARY (SPECIFY SITE) SURGICAL PATHOLOGY EXAM Sameer Tate MD 5/3/2024 0949   2 : LEVEL 9 LYMPH NODE Tissue LYMPH NODE PULMONARY (SPECIFY SITE) SURGICAL PATHOLOGY EXAM Sameer Tate MD 5/3/2024 0952   3 : LEVEL 7 LYMPH NODE Tissue LYMPH NODE PULMONARY (SPECIFY SITE) SURGICAL PATHOLOGY EXAM Sameer Tate MD 5/3/2024 1007   4 : LEVEL 11 LYMPH NODE Tissue LYMPH NODE PULMONARY (SPECIFY SITE) SURGICAL PATHOLOGY EXAM Sameer Tate MD 5/3/2024 1007   5 : LEVEL 12 LYMPH NODE Tissue LYMPH NODE PULMONARY (SPECIFY SITE) SURGICAL PATHOLOGY EXAM Sameer Tate MD 5/3/2024 1049   6 : RIGHT LOWER LOBE SUPERIOR SEGMENT Tissue  LUNG LOBECTOMY/SEGMENTECTOMY RIGHT (SPECIFY SITE) SURGICAL PATHOLOGY EXAM Sameer Tate MD 5/3/2024 1123     Attending Attestation: I was present and scrubbed for the entire procedure.    Sameer Tate  Phone Number: [phone]

## 2024-05-03 NOTE — ANESTHESIA PROCEDURE NOTES
Peripheral IV  Date/Time: 5/3/2024 8:16 AM      Placement  Needle size: 16 G  Laterality: right  Location: hand

## 2024-05-03 NOTE — CARE PLAN
The patient's goals for the shift include      The clinical goals for the shift include Patient will have tolerable pain throughout shift.

## 2024-05-03 NOTE — ANESTHESIA PROCEDURE NOTES
Peripheral IV  Date/Time: 5/3/2024 8:15 AM      Placement  Needle size: 14 G  Laterality: right  Location: hand

## 2024-05-03 NOTE — PERIOPERATIVE NURSING NOTE
Chest x-ray done and seen by MD  1340 family at bedside  1342 Dr Dickey aware that pt continues to be very sleepy, difficult to arouse, ABG ordered

## 2024-05-03 NOTE — ANESTHESIA PREPROCEDURE EVALUATION
Patient: Comfort Bliss    Procedure Information       Date/Time: 05/03/24 0715    Procedure: Thoracoscopic/ROBOTIC lung segmental resection Resection Robot-Assisted Lung Wedge [id number], Thoracoscopy Robot-Assisted [id number] (Right: Chest) - ROBOTIC right lower lobe superior segmental resection, ROBOT approved by Jacklyn Milian    Location: [facility] OR 14 / [facility] OR    Surgeons: Sameer Tate MD            Relevant Problems   Anesthesia (within normal limits)      Cardiac   (+) Essential (primary) hypertension      Pulmonary   (+) COPD (chronic obstructive pulmonary disease) (Multi)   (+) Centrilobular emphysema (Multi)      Neuro (within normal limits)      GI   (+) Gastroesophageal reflux disease without esophagitis      /Renal   (+) Acute UTI      Liver (within normal limits)      Endocrine   (+) Diabetes mellitus, type 2 (Multi)      Hematology (within normal limits)      ID   (+) Acute UTI   (+) COVID-19       Clinical information reviewed:   Tobacco  Allergies  Meds   Med Hx  Surg Hx   Fam Hx  Soc Hx        NPO Detail:  NPO/Void Status  Date of Last Liquid: 05/02/24  Time of Last Liquid: 2300  Date of Last Solid: 05/02/24  Time of Last Solid: 2200  Time of Last Void: 0500         Physical Exam    Airway  Mallampati: IV  TM distance: <3 FB  Neck ROM: full     Cardiovascular - normal exam  Rhythm: regular     Dental    Pulmonary - normal exam     Abdominal - normal exam         Anesthesia Plan    History of general anesthesia?: yes  History of complications of general anesthesia?: no    ASA 3     general     The patient is not a current smoker.  Patient was not previously instructed to abstain from smoking on day of procedure.  Patient did not smoke on day of procedure.    intravenous induction   Postoperative administration of opioids is intended.  Trial extubation is planned.  Anesthetic plan and risks discussed with patient.  Use of blood products discussed with patient who  consented to blood products.    Plan discussed with attending.

## 2024-05-03 NOTE — ANESTHESIA PROCEDURE NOTES
Airway  Date/Time: 5/3/2024 8:03 AM  Urgency: elective    Airway not difficult    Staffing  Performed: resident   Authorized by: Afua Dickey MD    Performed by: Amaya Tinsley MD  Patient location during procedure: OR    Indications and Patient Condition  Indications for airway management: anesthesia  Spontaneous ventilation: present  Sedation level: deep  Preoxygenated: yes  Patient position: sniffing  Mask difficulty assessment: 2 - vent by mask + OA or adjuvant +/- NMBA  Planned trial extubation    Final Airway Details  Final airway type: endotracheal airway      Successful airway: ETT - double lumen left  Cuffed: yes   Successful intubation technique: video laryngoscopy  Facilitating devices/methods: intubating stylet  Endotracheal tube insertion site: oral  Blade: Mely  Blade size: #3  ETT DL size (fr): 37  Cormack-Lehane Classification: grade I - full view of glottis  Placement verified by: capnometry   Measured from: teeth  ETT to teeth (cm): 25  Number of attempts at approach: 3 or more    Additional Comments  Patient ramped with blankets prior to first attempt with intubation. Grade I view on video laryngoscope. ELOY unable to pass arytenoids on first attempt. Patient remained HDS but was bag-mask ventilated between attempts. Airway was suctioned, with blood secretions noted. Second attempt made after increasing angle of ELOY, though some difficulty passing ELOY past arytenoids and cords. Patient bag-mask ventilated.  Third attempt with videolaryngoscope grade I view. Pediatric fiberoptic scope placed down bronchial lumen, advanced through vocal cords under visualization of video laryngoscope. Fibertopic advanced to L trachea. Airway secured and tracheal lumen confirmed above level of the valeria.

## 2024-05-03 NOTE — ANESTHESIA POSTPROCEDURE EVALUATION
Patient: Comfort Bliss    Procedure Summary       Date: 05/03/24 Room / Location: [facility] OR 14 / [facility] OR    Anesthesia Start: 0739 Anesthesia Stop: 1211    Procedure: Thoracoscopic/ROBOTIC lung segmental resection Resection Robot-Assisted Lung Wedge [id number], Thoracoscopy Robot-Assisted [id number] (Right: Chest) Diagnosis:       Carcinoid tumor determined by biopsy of lung (CMS-HCC)      (Carcinoid tumor determined by biopsy of lung [D3A.090])    Surgeons: Sameer Tate MD Responsible Provider: Afua Dickey MD    Anesthesia Type: general ASA Status: 3            Anesthesia Type: general    Vitals Value Taken Time   /67 05/03/24 1202   Temp 36.1 05/03/24 1211   Pulse 69 05/03/24 1209   Resp 14 05/03/24 1209   SpO2 99 % 05/03/24 1209   Vitals shown include unfiled device data.    Anesthesia Post Evaluation    Patient location during evaluation: PACU  Patient participation: complete - patient participated  Level of consciousness: awake and alert  Pain management: adequate  Airway patency: patent  Two or more strategies used to mitigate risk of obstructive sleep apnea  Cardiovascular status: acceptable and blood pressure returned to baseline  Respiratory status: acceptable  Hydration status: acceptable  Postoperative Nausea and Vomiting: none        No notable events documented.

## 2024-05-03 NOTE — OP NOTE
Thoracoscopic/ROBOTIC lung segmental resection Resection Robot-Assisted Lung Wedge [id number], Thoracoscopy Robot-Assisted [id number] (R) Operative Note  Patient: Comfort Bliss  : 1960  MRN: [id number]    Preoperative Diagnosis: Carcinoid tumor determined by biopsy of lung [D3A.090]  Postoperative Diagnosis: Carcinoid tumor determined by biopsy of lung [D3A.090]    Procedure: Procedure(s) (LRB):  Thoracoscopic/ROBOTIC lung segmental resection     Surgeon: Surgeon(s):  MD Yudy Silverman MD Jacquelyn R McCloskey, PA-C (no suitable resident was available at the bedside assist)    Other Staff:   Surgeons and Role:     * Yudy Yoon MD - Fellow   Circulator: Essie Nguyen RN; Jacklyn Milian, STEVEN; Jr Nance RN  Relief Scrub: Andre Chan  Scrub Person: Sarahdorothea Contemanasaia    Anesthesia Type: General    Indications: Comfort Bliss is an 64 y.o. female who presents for Carcinoid tumor determined by biopsy of lung [D3A.090] in the right lower lobe of her lung. I recommended surgical resection.      The patient was seen in the preoperative area. The risks, benefits, complications, treatment options, non-operative alternatives, expected recovery and outcomes were discussed with the patient. The possibilities of reaction to medication, pulmonary aspiration, injury to surrounding structures, bleeding, recurrent infection, the need for additional procedures, failure to diagnose a condition, and creating a complication requiring transfusion or operation were discussed with the patient. The patient concurred with the proposed plan, giving informed consent.  The site of surgery was properly noted/marked if necessary per policy.     Procedure Details:   The patient was placed on the operating table. A safety huddle was performed. General endotracheal anesthesia was initiated. Bronchoscopy was performed using the endotracheal tube which showed no evidence of endobronchial tumors or other lesions.  A  double-lumen endotracheal tube was positioned and secured.    The patient was positioned in lateral decubitus position. The patient was prepped with chlorhexidine & alcohol.    I started making an 8 mm robotic/thoracoscopic port incision in the right chest.  When we initially inserted the thoracoscope we were in the abdomen.  The capsule of the liver was disrupted by the trocar, and we observe this for bleeding and I felt it was appropriate.  We backed the trocar into the thorax, and I placed the remainder of my ports.  I closed the defect caused by the 8 mm port the diaphragm with a suture.  I placed multi-level intercostal nerve blocks.  We used CO2 insufflation. We docked the robot and explored the chest.      I explored the chest and confirmed the absence of visible metastatic disease.  The mass was difficult to palpate in the in the right lower lobe. The patient had no adhesions in the chest.  The fissures were poorly developed. This added significant complexity to the dissection..    I started by dividing the inferior pulmonary ligament and taking lymph nodes from level 7, 8R, and 9R.  I continued by dissecting the inferior vein. I identified the   pulmonary artery and the fissure. I dissected and divided the posterior fissure. The dissection was difficult and time consuming.  I also was able to remove lymph nodes in this area. I removed lymph nodes from 11R.      Next, I adjusted my retraction so I could identify the superior segmental branch of the  pulmonary artery. I dissected and divided the superior segmental branch of the  pulmonary artery.   I also was able to remove lymph nodes in this area. I removed lymph nodes from 12R.      After this, I focused on the superior segmental branch of the  bronchus. I dissected and divided the superior segmental branch of the  bronchus.    .       Lastly, I divided the  parenchyma to the superior segment of the   right lower lobe.  This completed the resection.  .    The specimen was removed and evaluated ex-vivo. I felt the margin was adequate. I therefore sent the specimen for permanent pathologic analysis.    The chest was evaluated for hemostasis and felt to be appropriate. I placed a 28F chest tube and re-expanded the lung. The lung expanded well. The instruments were removed. The wounds were closed in layers.  The skin was dressed with Dermabond.  The procedure was completed and patient was brought to Recovery without evidence of immediate complications.    This procedure was 20% more difficult than a standard procedure based on the complex dissection required    Estimated blood loss:  60  Complications:  Initial entry into the abdomen  Disposition: Recovery  Condition: stable    Attending Attestation: I was present and scrubbed for the entire procedure.    Specimens:   ID Type Source Tests Collected by Time   1 : LEVEL 8 LYMPH NODE Tissue LYMPH NODE PULMONARY (SPECIFY SITE) SURGICAL PATHOLOGY EXAM Sameer Tate MD 5/3/2024 0949   2 : LEVEL 9 LYMPH NODE Tissue LYMPH NODE PULMONARY (SPECIFY SITE) SURGICAL PATHOLOGY EXAM Sameer Tate MD 5/3/2024 0952   3 : LEVEL 7 LYMPH NODE Tissue LYMPH NODE PULMONARY (SPECIFY SITE) SURGICAL PATHOLOGY EXAM Sameer Tate MD 5/3/2024 1007   4 : LEVEL 11 LYMPH NODE Tissue LYMPH NODE PULMONARY (SPECIFY SITE) SURGICAL PATHOLOGY EXAM Sameer Tate MD 5/3/2024 1007   5 : LEVEL 12 LYMPH NODE Tissue LYMPH NODE PULMONARY (SPECIFY SITE) SURGICAL PATHOLOGY EXAM Sameer Tate MD 5/3/2024 1049   6 : RIGHT LOWER LOBE SUPERIOR SEGMENT Tissue LUNG LOBECTOMY/SEGMENTECTOMY RIGHT (SPECIFY SITE) SURGICAL PATHOLOGY EXAM Sameer Tate MD 5/3/2024 1123       Sameer Tate  Phone Number: [phone]

## 2024-05-03 NOTE — ANESTHESIA PROCEDURE NOTES
Arterial Line:    Date/Time: 5/3/2024 8:36 AM    Staffing  Performed: resident   Authorized by: Afua Dickey MD    Performed by: Amaya Tinsley MD    An arterial line was placed. Procedure performed using surface landmarks.in the OR for the following indication(s): continuous blood pressure monitoring and blood sampling needed.    A 20 gauge (size), 1 and 3/4 inch (length), Angiocath (type) catheter was placed into the Left radial artery, secured by tape      Additional notes:  Two attempts. First attempt with L radial artery with good pulsatile flow appreciated. Guidewire advancement met with resistance. Second attempt L radial, proximal to first attempt. Catheter advanced without resistance. Arterial line secured. No hematoma appreciated. Pulses intact.

## 2024-05-04 ENCOUNTER — APPOINTMENT (OUTPATIENT)
Dept: RADIOLOGY | Facility: HOSPITAL | Age: 64
End: 2024-05-04
Payer: COMMERCIAL

## 2024-05-04 ENCOUNTER — PHARMACY VISIT (OUTPATIENT)
Dept: PHARMACY | Facility: CLINIC | Age: 64
End: 2024-05-04
Payer: MEDICAID

## 2024-05-04 VITALS
TEMPERATURE: 97.3 F | SYSTOLIC BLOOD PRESSURE: 121 MMHG | DIASTOLIC BLOOD PRESSURE: 75 MMHG | BODY MASS INDEX: 32.37 KG/M2 | WEIGHT: 177 LBS | RESPIRATION RATE: 18 BRPM | HEART RATE: 89 BPM | OXYGEN SATURATION: 92 %

## 2024-05-04 LAB
ANION GAP SERPL CALC-SCNC: 15 MMOL/L (ref 10–20)
BUN SERPL-MCNC: 12 MG/DL (ref 6–23)
CALCIUM SERPL-MCNC: 8.9 MG/DL (ref 8.6–10.6)
CHLORIDE SERPL-SCNC: 103 MMOL/L (ref 98–107)
CO2 SERPL-SCNC: 23 MMOL/L (ref 21–32)
CREAT SERPL-MCNC: 0.67 MG/DL (ref 0.5–1.05)
EGFRCR SERPLBLD CKD-EPI 2021: >90 ML/MIN/1.73M*2
ERYTHROCYTE [DISTWIDTH] IN BLOOD BY AUTOMATED COUNT: 15 % (ref 11.5–14.5)
GLUCOSE BLD MANUAL STRIP-MCNC: 161 MG/DL (ref 74–99)
GLUCOSE BLD MANUAL STRIP-MCNC: 163 MG/DL (ref 74–99)
GLUCOSE BLD MANUAL STRIP-MCNC: 216 MG/DL (ref 74–99)
GLUCOSE SERPL-MCNC: 143 MG/DL (ref 74–99)
HCT VFR BLD AUTO: 32.3 % (ref 36–46)
HGB BLD-MCNC: 10 G/DL (ref 12–16)
MAGNESIUM SERPL-MCNC: 2.07 MG/DL (ref 1.6–2.4)
MCH RBC QN AUTO: 28.7 PG (ref 26–34)
MCHC RBC AUTO-ENTMCNC: 31 G/DL (ref 32–36)
MCV RBC AUTO: 93 FL (ref 80–100)
NRBC BLD-RTO: 0 /100 WBCS (ref 0–0)
PLATELET # BLD AUTO: 131 X10*3/UL (ref 150–450)
POTASSIUM SERPL-SCNC: 4.3 MMOL/L (ref 3.5–5.3)
RBC # BLD AUTO: 3.48 X10*6/UL (ref 4–5.2)
SODIUM SERPL-SCNC: 137 MMOL/L (ref 136–145)
WBC # BLD AUTO: 6.9 X10*3/UL (ref 4.4–11.3)

## 2024-05-04 PROCEDURE — 71045 X-RAY EXAM CHEST 1 VIEW: CPT

## 2024-05-04 PROCEDURE — 36415 COLL VENOUS BLD VENIPUNCTURE: CPT | Performed by: PHYSICIAN ASSISTANT

## 2024-05-04 PROCEDURE — 2500000001 HC RX 250 WO HCPCS SELF ADMINISTERED DRUGS (ALT 637 FOR MEDICARE OP): Performed by: PHYSICIAN ASSISTANT

## 2024-05-04 PROCEDURE — 2500000005 HC RX 250 GENERAL PHARMACY W/O HCPCS: Performed by: PHYSICIAN ASSISTANT

## 2024-05-04 PROCEDURE — 80048 BASIC METABOLIC PNL TOTAL CA: CPT | Performed by: PHYSICIAN ASSISTANT

## 2024-05-04 PROCEDURE — 2500000002 HC RX 250 W HCPCS SELF ADMINISTERED DRUGS (ALT 637 FOR MEDICARE OP, ALT 636 FOR OP/ED): Performed by: PHYSICIAN ASSISTANT

## 2024-05-04 PROCEDURE — 2500000006 HC RX 250 W HCPCS SELF ADMINISTERED DRUGS (ALT 637 FOR ALL PAYERS): Performed by: PHYSICIAN ASSISTANT

## 2024-05-04 PROCEDURE — 94640 AIRWAY INHALATION TREATMENT: CPT

## 2024-05-04 PROCEDURE — 71045 X-RAY EXAM CHEST 1 VIEW: CPT | Performed by: RADIOLOGY

## 2024-05-04 PROCEDURE — RXMED WILLOW AMBULATORY MEDICATION CHARGE

## 2024-05-04 PROCEDURE — 2500000004 HC RX 250 GENERAL PHARMACY W/ HCPCS (ALT 636 FOR OP/ED): Mod: JZ | Performed by: PHYSICIAN ASSISTANT

## 2024-05-04 PROCEDURE — 82947 ASSAY GLUCOSE BLOOD QUANT: CPT

## 2024-05-04 PROCEDURE — 2500000001 HC RX 250 WO HCPCS SELF ADMINISTERED DRUGS (ALT 637 FOR MEDICARE OP): Performed by: STUDENT IN AN ORGANIZED HEALTH CARE EDUCATION/TRAINING PROGRAM

## 2024-05-04 PROCEDURE — 83735 ASSAY OF MAGNESIUM: CPT | Performed by: PHYSICIAN ASSISTANT

## 2024-05-04 PROCEDURE — 85027 COMPLETE CBC AUTOMATED: CPT | Performed by: PHYSICIAN ASSISTANT

## 2024-05-04 RX ORDER — OXYCODONE HYDROCHLORIDE 5 MG/1
10 TABLET ORAL EVERY 4 HOURS PRN
Status: CANCELLED | OUTPATIENT
Start: 2024-05-04

## 2024-05-04 RX ORDER — ACETAMINOPHEN 325 MG/1
650 TABLET ORAL EVERY 4 HOURS PRN
Qty: 30 TABLET | Refills: 0 | Status: SHIPPED | OUTPATIENT
Start: 2024-05-04

## 2024-05-04 RX ORDER — ACETAMINOPHEN 325 MG/1
650 TABLET ORAL EVERY 4 HOURS PRN
Qty: 30 TABLET | Refills: 0 | Status: SHIPPED | OUTPATIENT
Start: 2024-05-04 | End: 2024-05-04

## 2024-05-04 RX ORDER — OXYCODONE HYDROCHLORIDE 5 MG/1
5 TABLET ORAL EVERY 4 HOURS PRN
Qty: 15 TABLET | Refills: 0 | Status: SHIPPED | OUTPATIENT
Start: 2024-05-04

## 2024-05-04 RX ORDER — OXYCODONE HYDROCHLORIDE 5 MG/1
5 TABLET ORAL EVERY 4 HOURS PRN
Status: CANCELLED | OUTPATIENT
Start: 2024-05-04

## 2024-05-04 RX ORDER — OXYCODONE HYDROCHLORIDE 5 MG/1
5 TABLET ORAL EVERY 4 HOURS PRN
Qty: 15 TABLET | Refills: 0 | Status: SHIPPED | OUTPATIENT
Start: 2024-05-04 | End: 2024-05-04

## 2024-05-04 RX ADMIN — OXYBUTYNIN CHLORIDE 5 MG: 5 TABLET ORAL at 09:26

## 2024-05-04 RX ADMIN — LEVOTHYROXINE SODIUM 25 MCG: 25 TABLET ORAL at 06:04

## 2024-05-04 RX ADMIN — METOPROLOL TARTRATE 5 MG: 1 INJECTION, SOLUTION INTRAVENOUS at 06:04

## 2024-05-04 RX ADMIN — CEFAZOLIN SODIUM 1 G: 1 INJECTION, SOLUTION INTRAVENOUS at 02:35

## 2024-05-04 RX ADMIN — ACETAMINOPHEN 650 MG: 325 TABLET ORAL at 10:22

## 2024-05-04 RX ADMIN — INSULIN HUMAN 2 UNITS: 100 INJECTION, SOLUTION PARENTERAL at 13:54

## 2024-05-04 RX ADMIN — FORMOTEROL FUMARATE DIHYDRATE 20 MCG: 20 SOLUTION RESPIRATORY (INHALATION) at 08:40

## 2024-05-04 RX ADMIN — HEPARIN SODIUM 5000 UNITS: 5000 INJECTION INTRAVENOUS; SUBCUTANEOUS at 02:35

## 2024-05-04 RX ADMIN — INSULIN HUMAN 1 UNITS: 100 INJECTION, SOLUTION PARENTERAL at 10:28

## 2024-05-04 RX ADMIN — METOPROLOL TARTRATE 5 MG: 1 INJECTION, SOLUTION INTRAVENOUS at 10:24

## 2024-05-04 RX ADMIN — DOCUSATE SODIUM 100 MG: 100 CAPSULE, LIQUID FILLED ORAL at 09:26

## 2024-05-04 RX ADMIN — PANTOPRAZOLE SODIUM 40 MG: 40 TABLET, DELAYED RELEASE ORAL at 09:36

## 2024-05-04 RX ADMIN — HEPARIN SODIUM 5000 UNITS: 5000 INJECTION INTRAVENOUS; SUBCUTANEOUS at 10:24

## 2024-05-04 RX ADMIN — OXYCODONE HYDROCHLORIDE 5 MG: 5 TABLET ORAL at 10:22

## 2024-05-04 RX ADMIN — METOPROLOL TARTRATE 5 MG: 1 INJECTION, SOLUTION INTRAVENOUS at 16:51

## 2024-05-04 RX ADMIN — OXYCODONE HYDROCHLORIDE 5 MG: 5 TABLET ORAL at 16:57

## 2024-05-04 ASSESSMENT — PAIN DESCRIPTION - LOCATION: LOCATION: INCISION

## 2024-05-04 ASSESSMENT — PAIN - FUNCTIONAL ASSESSMENT
PAIN_FUNCTIONAL_ASSESSMENT: 0-10
PAIN_FUNCTIONAL_ASSESSMENT: 0-10

## 2024-05-04 ASSESSMENT — PAIN SCALES - GENERAL
PAINLEVEL_OUTOF10: 8
PAINLEVEL_OUTOF10: 9

## 2024-05-04 NOTE — NURSING NOTE
Pt with no urine output.  Bladder scan shows 600 ml lingering.  Pt attempts to urinate on her own, but is unable.  Dr Wiley updated and orders straight cath.  Straight cath performed on pt.  Genatalia cleansed using the provided wipes in kit, nurses hands cleansed with enclosed antibacterial gel.  Sterile gloves donned and drapes placed under pt and over vaginal area. Cleansed site again with betadine swabs and insertion of 14 fr cath that drains 475 ml dhaval colored urine .  Pt tolerates well.  Dr Wiley updated on output.

## 2024-05-04 NOTE — PROGRESS NOTES
Comfort Bliss is a 64 y.o. female on day 1 of admission presenting with Carcinoid tumor determined by biopsy of lung (CMS-[facility]).    Subjective   ***       Objective     Physical Exam    Last Recorded Vitals  Blood pressure 121/75, pulse 89, temperature 36.3 °C (97.3 °F), temperature source Temporal, resp. rate 18, weight 80.3 kg (177 lb), SpO2 92%.  Intake/Output last 3 Shifts:  I/O last 3 completed shifts:  In: 400 (5 mL/kg) [I.V.:400 (5 mL/kg)]  Out: 898 (11.2 mL/kg) [Urine:615 (0.2 mL/kg/hr); Blood:100; Chest Tube:183]  Weight: 80.3 kg     Relevant Results  {If you would like to pull in Medications, type .meds     If you would like to pull in Lab results for the last 24 hours, type .[id number]    If you would like to pull in Imaging results, type .imgrslt :99}    {Link to Stroke Scoring tools - Link :99}                       Assessment/Plan   Principal Problem:    Carcinoid tumor determined by biopsy of lung (CMS-HCC)  Active Problems:    Diabetes mellitus, type 2 (Multi)    ***     {This patient does not have an ACP note on file for this encounter, please fill one out - Advance Care Planning Activity :99}      Charlee Collado RN

## 2024-05-04 NOTE — PROGRESS NOTES
Comfort Bliss is a 64 y.o. female on day 1 of admission presenting with Carcinoid tumor determined by biopsy of lung (CMS-HCC).  Met with patient to introduce myself, role and discuss discharge planning.  Patient lives with her spouse.  Independent in all adl's.  Patient requires no assist devices for mobility.  Patient denies active home care or home care needs.  Patient spouse uses a walker and cane.  He is also set up with the waiver program.  Patient drives and denies any issues with making follow up appointments or getting her medications.  Family will transport home.  PCP:  Richelle Esteves,  Pharmacy:  [facility]  Home Care:  N/A  DME:  N/A    Physical Exam    Last Recorded Vitals  Blood pressure 121/75, pulse 89, temperature 36.3 °C (97.3 °F), temperature source Temporal, resp. rate 18, weight 80.3 kg (177 lb), SpO2 92%.  Intake/Output last 3 Shifts:  I/O last 3 completed shifts:  In: 400 (5 mL/kg) [I.V.:400 (5 mL/kg)]  Out: 898 (11.2 mL/kg) [Urine:615 (0.2 mL/kg/hr); Blood:100; Chest Tube:183]  Weight: 80.3 kg         Assessment/Plan   Principal Problem:    Carcinoid tumor determined by biopsy of lung (CMS-[facility])  Active Problems:    Diabetes mellitus, type 2 (Multi)              Charlee Collado RN

## 2024-05-04 NOTE — DISCHARGE SUMMARY
Discharge Diagnosis  Carcinoid tumor determined by biopsy of lung (CMS-HCC)    Issues Requiring Follow-Up  Pathology report and post op check     Test Results Pending At Discharge  Pending Labs       Order Current Status    BLOOD GAS ARTERIAL FULL PANEL In process    Surgical Pathology Exam In process            Hospital Course   Patient is a 64-year-old female who underwent a  robotic right lower lobe superior segmental resection on May 3, 2024 for treatment of carcinoid tumor of the lung.  Her post operative course was uncomplicated. She is tolerating regular diet, she is passing gas. Pain is controlled. Chest tube with no air leak. Was removed with satisfactory post pull chest xray. Patient passed TOV. Pain is controlled with PO pain medicine. She is safe for DC home with appropriate follow up. She will be staying with her son and daughter in law for the post op period.     Pertinent Physical Exam At Time of Discharge  Physical Exam  General: A&O x3, well developed   Neuro: no deficits   CV: NSR on tele   Pulm: breathing comfortably on RA   GI: soft nontender nondistended   MSK: good ROM     Home Medications     Medication List      START taking these medications     acetaminophen 325 mg tablet; Commonly known as: Tylenol; Take 2 tablets   (650 mg) by mouth every 4 hours if needed for fever (temp greater than   38.0 C).   oxyCODONE 5 mg immediate release tablet; Commonly known as: Roxicodone;   Take 1 tablet (5 mg) by mouth every 4 hours if needed for moderate pain (4   - 6).     CONTINUE taking these medications     albuterol 2.5 mg /3 mL (0.083 %) nebulizer solution; Take 3 mL (2.5 mg)   by nebulization 4 times a day as needed for wheezing or shortness of   breath.   cholecalciferol 125 MCG (5000 UT) capsule; Commonly known as: Vitamin   D-3   Januvia 100 mg tablet; Generic drug: SITagliptin phosphate   levothyroxine 25 mcg tablet; Commonly known as: Synthroid, Levoxyl   lisinopril 5 mg tablet   metFORMIN XR  500 mg 24 hr tablet; Commonly known as: Glucophage-XR   NON FORMULARY   omeprazole 20 mg DR capsule; Commonly known as: PriLOSEC   OneTouch Delica Plus Lancet 33 gauge misc; Generic drug: lancets   OneTouch Ultra Test strip; Generic drug: blood sugar diagnostic   OneTouch Ultra2 Meter misc; Generic drug: blood-glucose meter   oxybutynin XL 10 mg 24 hr tablet; Commonly known as: Ditropan-XL   Stiolto Respimat 2.5-2.5 mcg/actuation mist inhaler; Generic drug:   tiotropium-olodateroL; Inhale 2 Inhalations once daily.     STOP taking these medications     chlorhexidine 0.12 % solution; Commonly known as: Peridex   chlorhexidine 4 % external liquid; Commonly known as: Hibiclens       Outpatient Follow-Up  Future Appointments   Date Time Provider Department Center   5/15/2024  2:15 PM Sameer Tate MD [id number]       Juany Castanon DO

## 2024-05-04 NOTE — NURSING NOTE
Pt fully alert at this time and educated on the use and goals associated with I/S.  Instructed pt that she should do 10 breaths an hour while awake.  Pt voices that she will use.  Unable to set goals at this time due to severe pain that pt cannot do I/S at this time.

## 2024-05-07 NOTE — PROGRESS NOTES
Subjective   Comfort Bliss  is a 64 y.o. female who presents for evaluation of newly diagnosed right lower lobe carcinoid cancer status post robotic right lower lobe segmental resection on 5/3/24.     This patient received radiographic imaging in the setting of COPD.  This identified a pulmonary nodule.  January 2024, a right lower lobe nodule grew from 12 x 9 mm to 17 x 10 mm.  She was referred for bronchoscopy procedure which was performed on 3/7/2024 and the right lower lobe biopsy was consistent with carcinoid tumor (Ki-67 proliferative index is low 2%). She received surgery on 5/3 and did well post-operatively.     Currently the patient is reporting symptoms of improving pain which is at her incision and radiating anteriorly . She denies the following symptoms: shortness of breath at rest, shortness of breath with activity,  hemoptysis, fevers, chills, weight loss, and wound complications.  She is coughing intermittently which is non-productive when she lays down.    There have been no significant changes to their documented medical, surgical and family history.     She  reports that she quit smoking about 4 years ago. Her smoking use included cigarettes. She has never used smokeless tobacco. She reports that she does not currently use alcohol. She reports that she does not use drugs.    Objective   Physical Exam  The patient is well-appearing and in no acute distress. The trachea is midline and there is no crepitus. The lungs were clear to auscultation, there was no dullness to percussion, no fremitus or egophony. There was good effort and excursion. The heart had a regular rate and rhythm. The abdomen was soft, nontender and nondistended. The extremities had bilateral pedal edema. Surgical incisions are healing well.  Diagnostic Studies  I reviewed a post-operative CXR which shows no significant pleural effusion or pneumothorax  Path: pending  Assessment/Plan   Overall, I believe that the patient is recovering  appropriately from their recent surgery.     The patient is recently status post surgical intervention.  I believe they are recovering appropriately.  Their clinical and/or radiographic presentation suggest no evidence of postoperative complications.    Lasix for pedal edema.     Phone call to discuss pathology in 1 week -  I believe she will likely get CT scan every 6 months for 2 years and then yearly after that    I recommend phone call in 1 week    I discussed this in detail with the patient, including a discussion of alternatives. They were comfortable with this approach.     Sameer Tate MD  [phone]

## 2024-05-08 LAB
ATRIAL RATE: 67 BPM
P AXIS: 73 DEGREES
P OFFSET: 194 MS
P ONSET: 148 MS
PR INTERVAL: 144 MS
Q ONSET: 220 MS
QRS COUNT: 12 BEATS
QRS DURATION: 68 MS
QT INTERVAL: 420 MS
QTC CALCULATION(BAZETT): 443 MS
QTC FREDERICIA: 436 MS
R AXIS: -5 DEGREES
T AXIS: -5 DEGREES
T OFFSET: 430 MS
VENTRICULAR RATE: 67 BPM

## 2024-05-15 ENCOUNTER — HOSPITAL ENCOUNTER (OUTPATIENT)
Dept: RADIOLOGY | Facility: HOSPITAL | Age: 64
Discharge: HOME | End: 2024-05-15
Payer: COMMERCIAL

## 2024-05-15 ENCOUNTER — OFFICE VISIT (OUTPATIENT)
Dept: SURGERY | Facility: CLINIC | Age: 64
End: 2024-05-15
Payer: COMMERCIAL

## 2024-05-15 VITALS
HEART RATE: 80 BPM | WEIGHT: 169 LBS | SYSTOLIC BLOOD PRESSURE: 114 MMHG | RESPIRATION RATE: 24 BRPM | HEIGHT: 62 IN | OXYGEN SATURATION: 97 % | DIASTOLIC BLOOD PRESSURE: 76 MMHG | BODY MASS INDEX: 31.1 KG/M2 | TEMPERATURE: 97.9 F

## 2024-05-15 DIAGNOSIS — R60.0 PEDAL EDEMA: Primary | ICD-10-CM

## 2024-05-15 DIAGNOSIS — C34.90 MALIGNANT NEOPLASM OF LUNG, UNSPECIFIED LATERALITY, UNSPECIFIED PART OF LUNG (MULTI): ICD-10-CM

## 2024-05-15 DIAGNOSIS — C34.31 MALIGNANT NEOPLASM OF LOWER LOBE OF RIGHT LUNG (MULTI): ICD-10-CM

## 2024-05-15 PROCEDURE — 3074F SYST BP LT 130 MM HG: CPT | Performed by: THORACIC SURGERY (CARDIOTHORACIC VASCULAR SURGERY)

## 2024-05-15 PROCEDURE — 3078F DIAST BP <80 MM HG: CPT | Performed by: THORACIC SURGERY (CARDIOTHORACIC VASCULAR SURGERY)

## 2024-05-15 PROCEDURE — 71046 X-RAY EXAM CHEST 2 VIEWS: CPT

## 2024-05-15 PROCEDURE — 4010F ACE/ARB THERAPY RXD/TAKEN: CPT | Performed by: THORACIC SURGERY (CARDIOTHORACIC VASCULAR SURGERY)

## 2024-05-15 PROCEDURE — 99024 POSTOP FOLLOW-UP VISIT: CPT | Performed by: THORACIC SURGERY (CARDIOTHORACIC VASCULAR SURGERY)

## 2024-05-15 PROCEDURE — 71046 X-RAY EXAM CHEST 2 VIEWS: CPT | Performed by: RADIOLOGY

## 2024-05-15 PROCEDURE — 1036F TOBACCO NON-USER: CPT | Performed by: THORACIC SURGERY (CARDIOTHORACIC VASCULAR SURGERY)

## 2024-05-15 RX ORDER — FUROSEMIDE 20 MG/1
10 TABLET ORAL DAILY
Qty: 10 TABLET | Refills: 0 | Status: SHIPPED | OUTPATIENT
Start: 2024-05-15 | End: 2024-05-22

## 2024-05-15 ASSESSMENT — ENCOUNTER SYMPTOMS
LOSS OF SENSATION IN FEET: 0
OCCASIONAL FEELINGS OF UNSTEADINESS: 1
DEPRESSION: 1

## 2024-05-15 ASSESSMENT — PAIN SCALES - GENERAL: PAINLEVEL: 5

## 2024-05-15 NOTE — PROGRESS NOTES
Reason For Visit  COPD, Carcinoid     History Of Present Illness  Comfort Bliss is a 64 y.o. female with PMHx s/f COPD (moderate obstruction on PFTs in 2020, no obstruction on PFTs 2024) who presents to clinic to follow up.     Patient was last seen 01/2024 after referral for enlarging nodule. Patient first found to have RLL nodule in 2020, PET obtained at that time which showed minimal avidity so patient was recommended for surveillance imaging. Most recent CT Chest 01/2024 showed interval increase in size of RLL nodule, now measuring [id number] so she was referred to pulmonary clinic. She was referred for bronchoscopy, diagnosed with carcinoid and planned for segmentectomy with thoracic surgery.      Patient continues to endorse WATKINS although improved overall. Notices mostly when trying to do chores around the house. Endorses chronic cough, unproductive. Denies chest pain, LE edema, orthopnea or PND. Denies fevers, chills. Currently on no daily inhalers. Using Stiolto daily, still using rescue inhaler 1-2x/day.     PFTs 2024: no obstruction, no significant BD response, moderate restriction, DLCO wnl  Outside PFTs 10/2020 report: moderate obstruction, mild BP response, mild restriction, DLCO low but correct with VA.      CT Chest 01/2024:  Interval increase in the superior segment of the right lower lobe nodule, now   measuring 17 x 10 x 11 mm, previously measuring 12 x 8 x 9 mm.   No new suspicious nodule by size criteria. Redemonstrated are several, punctate   centrilobular nodules primarily throughout the periphery of the right lower   lobe.   Moderate upper lobe predominant centrilobular emphysema.   Curvilinear focus of bandlike atelectasis or scar in the lingula.   No consolidation. No pleural effusion. Central airways are patent.      Echo 11/2020:  Normal LV systolic function  EF 65% without apparent wall motion abnormallities   Doppler pattern consistent with reduced diastolic LV compliance   No valvular  disease or pathologic flow abnormalities identified   Normal PA pressure 20 mm Hg   No pericardial effusion      12 point ROS is negative except noted above     Past Medical History  Medical History   No past medical history on file.     Surgical History  Surgical History   No past surgical history on file.     Family History  Family History   No family history on file.     Social History  Social History            Tobacco Use    Smoking status: Former       Types: Cigarettes       Quit date:        Years since quittin.1   Substance Use Topics    Alcohol use: Not Currently       Comment: stopped 12 yrs ago    Drug use: Never      Allergies  Amoxicillin, Bee venom protein (honey bee), and Penicillins     Vital Signs  Blood pressure 124/66, pulse 71, temperature 36.1 °C (96.9 °F), weight 77.1 kg (170 lb), SpO2 97 %.  Oxygen Therapy  SpO2: 97 %     Physical Exam  Gen: Pleasant, no acute distress  HEENT: no gross abnormalities  CV: RRR  Lungs: CTAB  GI: soft, non tender, non distended  Ext: no LE edema  Neuro: AOx3, motor and sensory grossly intact  Psych: appears appropriate      Relevant Results: as above     Assessment/Plan   Comfort Bliss is a 64 y.o. female with PMHx s/f COPD (moderate obstruction on PFTs) who presents to clinic to follow up.     #COPD  -repeat PFTs without improvement on obstruction from reported PFTs, worse restriction  -unclear if persistent symptoms related to obstruction or restrictive changes  -continue Stiolto, continue Albuterol PRN for now  -nebulizer sent home    #Restriction on PFTs  -with no clear parenchymal abnormalities on chest imaging  -will repeat PFTs with MIP/MEP     #RLL Carcinoid  -planned for resection with Thoracic surgery  -no prohibitive risk from pulmonary perspective     RTC in 3 months

## 2024-05-20 NOTE — PROGRESS NOTES
"Subjective   Comfort Bliss  is a 64 y.o. female who presents for evaluation of newly diagnosed right lower lobe carcinoid cancer status post robotic right lower lobe segmental resection on 5/3/24.     This patient received radiographic imaging in the setting of COPD.  This identified a pulmonary nodule.  January 2024, a right lower lobe nodule grew from 12 x 9 mm to 17 x 10 mm.  She was referred for bronchoscopy procedure which was performed on 3/7/2024 and the right lower lobe biopsy was consistent with carcinoid tumor (Ki-67 proliferative index is low 2%). She received surgery on 5/3 and did well post-operatively.     Currently the patient is  \"doing better\" . She denies the following symptoms: chest pain, shortness of breath at rest, shortness of breath with activity, cough, hemoptysis, fevers, chills, and weight loss.  Ankle swelling is persistent    There have been no significant changes to their documented medical, surgical and family history.     She  reports that she quit smoking about 4 years ago. Her smoking use included cigarettes. She has never used smokeless tobacco. She reports that she does not currently use alcohol. She reports that she does not use drugs.    Objective   Physical Exam  Phone visit  Diagnostic Studies  I have reviewed a pathology result :Well differentiated neuroendocrine tumor producing serotonin and CEA, grade 2: Lung. All margins are negative for invasive     Assessment/Plan   Overall, I believe that the patient is doing well.     Based on the patient's pathologic stage, I recommend that they receive no additional treatment at this time.  I would recommend radiographic surveillance of early stage lung cancer with radiographic imaging performed every 6 months for 2 years and yearly after this.    She is seeing her PMD to discuss the foot swelling.     I recommend CT chest in 6 months    I discussed this in detail with the patient, including a discussion of alternatives. They were " comfortable with this approach.     Time 5 min    Sameer Tate MD  [phone]

## 2024-05-21 LAB
LAB AP ASR DISCLAIMER: NORMAL
LAB AP BLOCK FOR ADDITIONAL STUDIES: NORMAL
LABORATORY COMMENT REPORT: NORMAL
PATH REPORT.COMMENTS IMP SPEC: NORMAL
PATH REPORT.FINAL DX SPEC: NORMAL
PATH REPORT.GROSS SPEC: NORMAL
PATH REPORT.RELEVANT HX SPEC: NORMAL
PATH REPORT.TOTAL CANCER: NORMAL
PATHOLOGY SYNOPTIC REPORT: NORMAL

## 2024-05-21 PROCEDURE — 88341 IMHCHEM/IMCYTCHM EA ADD ANTB: CPT | Performed by: PATHOLOGY

## 2024-05-21 PROCEDURE — 88342 IMHCHEM/IMCYTCHM 1ST ANTB: CPT | Performed by: PATHOLOGY

## 2024-05-22 ENCOUNTER — TELEMEDICINE (OUTPATIENT)
Dept: SURGERY | Facility: CLINIC | Age: 64
End: 2024-05-22
Payer: COMMERCIAL

## 2024-05-22 DIAGNOSIS — D3A.090 CARCINOID TUMOR DETERMINED BY BIOPSY OF LUNG (CMS-HCC): Primary | ICD-10-CM

## 2024-05-22 PROCEDURE — 99441 PR PHYS/QHP TELEPHONE EVALUATION 5-10 MIN: CPT | Performed by: THORACIC SURGERY (CARDIOTHORACIC VASCULAR SURGERY)

## 2024-05-22 PROCEDURE — 4010F ACE/ARB THERAPY RXD/TAKEN: CPT | Performed by: THORACIC SURGERY (CARDIOTHORACIC VASCULAR SURGERY)

## 2024-05-22 ASSESSMENT — ENCOUNTER SYMPTOMS
OCCASIONAL FEELINGS OF UNSTEADINESS: 1
DEPRESSION: 0
LOSS OF SENSATION IN FEET: 1

## 2024-05-28 NOTE — DOCUMENTATION CLARIFICATION NOTE
PATIENT:               TANYA ZHONG  ACCT #:                  [id number]  MRN:                       [id number]  :                       1960  ADMIT DATE:       5/3/2024 6:02 AM  DISCH DATE:        2024 5:34 PM  RESPONDING PROVIDER #:        [id number]          PROVIDER RESPONSE TEXT:    I concur with the pathology report findings and they are clinically significant    CDI QUERY TEXT:    Clarification    Instruction:    Based on your assessment of the patient and the clinical information, please provide the requested documentation by clicking on the appropriate radio button and enter any additional information if prompted.    Question: Please document whether you concur or do not concur with the pathology report findings    When answering this query, please exercise your independent professional judgment. The fact that a question is being asked, does not imply that any particular answer is desired or expected.    The patient's clinical indicators include:  Clinical Information: Patient is a 64 year old female who presented with a Carcinoid tumor determined by biopsy, in the right lower lobe of her lung.    Clinical Indicators and Pathology Findings: Patient is s/p Thoracoscopic/robotic lung segmental resection on 5/3/24. Pathology findings 24: ?F. Well differentiated neuroendocrine tumor producing serotonin and CEA, grade 2: Lung  No pathological diagnosis: Lymph node  - lung (right lower lobe, superior) segmental resection?    Treatment: biopsy    Risk Factors: history of smoking  Options provided:  -- I concur with the pathology report findings and they are clinically significant  -- I do not concur with the pathology report findings  -- Other - I will add my own diagnosis  -- Refer to Clinical Documentation Reviewer    Query created by: Hemalatha Harrell on 2024 3:02 PM      Electronically signed by:  LUL GARCIA MD 2024 8:03 AM

## 2024-07-29 ENCOUNTER — OFFICE VISIT (OUTPATIENT)
Dept: PULMONOLOGY | Facility: HOSPITAL | Age: 64
End: 2024-07-29
Payer: COMMERCIAL

## 2024-07-29 VITALS
TEMPERATURE: 97.7 F | WEIGHT: 154 LBS | SYSTOLIC BLOOD PRESSURE: 115 MMHG | HEART RATE: 69 BPM | RESPIRATION RATE: 20 BRPM | OXYGEN SATURATION: 99 % | BODY MASS INDEX: 28.17 KG/M2 | DIASTOLIC BLOOD PRESSURE: 64 MMHG

## 2024-07-29 DIAGNOSIS — G89.12 POST-THORACOTOMY PAIN: ICD-10-CM

## 2024-07-29 DIAGNOSIS — R05.2 SUBACUTE COUGH: ICD-10-CM

## 2024-07-29 DIAGNOSIS — J44.9 CHRONIC OBSTRUCTIVE PULMONARY DISEASE, UNSPECIFIED COPD TYPE (MULTI): ICD-10-CM

## 2024-07-29 DIAGNOSIS — R04.2 HEMOPTYSIS: Primary | ICD-10-CM

## 2024-07-29 PROCEDURE — 99214 OFFICE O/P EST MOD 30 MIN: CPT | Mod: GC | Performed by: STUDENT IN AN ORGANIZED HEALTH CARE EDUCATION/TRAINING PROGRAM

## 2024-07-29 PROCEDURE — 99214 OFFICE O/P EST MOD 30 MIN: CPT | Performed by: STUDENT IN AN ORGANIZED HEALTH CARE EDUCATION/TRAINING PROGRAM

## 2024-07-29 PROCEDURE — 3074F SYST BP LT 130 MM HG: CPT | Performed by: STUDENT IN AN ORGANIZED HEALTH CARE EDUCATION/TRAINING PROGRAM

## 2024-07-29 PROCEDURE — 4010F ACE/ARB THERAPY RXD/TAKEN: CPT | Performed by: STUDENT IN AN ORGANIZED HEALTH CARE EDUCATION/TRAINING PROGRAM

## 2024-07-29 PROCEDURE — 3078F DIAST BP <80 MM HG: CPT | Performed by: STUDENT IN AN ORGANIZED HEALTH CARE EDUCATION/TRAINING PROGRAM

## 2024-07-29 RX ORDER — FLUTICASONE PROPIONATE AND SALMETEROL 100; 50 UG/1; UG/1
1 POWDER RESPIRATORY (INHALATION)
Qty: 60 EACH | Refills: 11 | Status: SHIPPED | OUTPATIENT
Start: 2024-07-29 | End: 2025-07-29

## 2024-07-29 RX ORDER — TRANEXAMIC ACID 650 MG/1
1300 TABLET ORAL 3 TIMES DAILY
Qty: 30 TABLET | Refills: 0 | Status: SHIPPED | OUTPATIENT
Start: 2024-07-29 | End: 2024-08-03

## 2024-07-29 RX ORDER — LIDOCAINE 50 MG/G
1 PATCH TOPICAL DAILY
Qty: 30 PATCH | Refills: 1 | Status: SHIPPED | OUTPATIENT
Start: 2024-07-29 | End: 2025-07-29

## 2024-07-29 RX ORDER — ALBUTEROL SULFATE 90 UG/1
INHALANT RESPIRATORY (INHALATION)
COMMUNITY
Start: 2024-07-03

## 2024-07-29 ASSESSMENT — PAIN SCALES - GENERAL: PAINLEVEL: 0-NO PAIN

## 2024-07-29 NOTE — PROGRESS NOTES
Subjective   Patient ID: Comfort Bliss is a 64 y.o. female who presents for Follow-up.  HPI  This is a 64-year-old female patient with past medical history of pulmonary carcinoid s/p robotic right lower lobe segmental resection on 5/3/24 . Patient presented to the clinic today for hemoptysis.  Patient mentioned that she has daily episodes of hemoptysis for almost 10 days after her surgery in 5/2024 that stopped without any intervention, she did not mention it to her surgeon on the follow up visit. Over the past month she has been having daily hemoptysis, it is of small amount (tea spoon), sometimes bright red and sometimes mixed with sputum, she believes that it happens more frequently at night. She also mentioned that she has  cough that also started after the surgery, sometimes wakes her up form  sleep. She also mentioned having some discomfort at the site of the surgical incision.  Denies chest pain, nose bleeding, or bleeding from other sites.   Patient was seen in the ED on 7/2024, had CT scan of the chest that was unremarkable.  She mentioned that she used to use Spiriva inhaler but she stopped as it makes the SOB worse.   She stopped smoking 4 years ago, she smoked almost 1 ppd for years.    Review of Systems  As above.      Objective   Physical Exam  Constitutional:       General: She is not in acute distress.     Appearance: She is obese. She is not ill-appearing.   HENT:      Mouth/Throat:      Mouth: Mucous membranes are moist.      Pharynx: Oropharynx is clear. No oropharyngeal exudate.   Cardiovascular:      Rate and Rhythm: Normal rate and regular rhythm.      Pulses: Normal pulses.      Heart sounds: No murmur heard.  Pulmonary:      Effort: Pulmonary effort is normal. No respiratory distress.      Breath sounds: Normal breath sounds. No stridor. No wheezing.   Abdominal:      General: There is no distension.      Palpations: Abdomen is soft.      Tenderness: There is no abdominal tenderness.    Musculoskeletal:         General: No swelling or tenderness.   Skin:     General: Skin is warm and dry.   Neurological:      General: No focal deficit present.      Mental Status: She is alert.   Psychiatric:         Mood and Affect: Mood normal.             CT scan of the chest 7/2024:  1. Wedge resection changes in the right lower lobe without CT findings of local recurrent or metastatic disease in the thorax.   2. Trace right pleural effusion.   3. Diffuse heterogeneity of the liver parenchyma is favored to relate to heterogeneous hepatic steatosis.   4. Spleen is enlarged, similar to prior exam.   RPS/alt       TTE 2020:  Conclusion   Normal LV systolic function  EF 65% without apparent wall motion abnormallities   Doppler pattern consistent with reduced diastolic LV compliance   No valvular disease or pathologic flow abnormalities identified   Normal PA pressure 20 mm Hg   No pericardial effusion         PFTs 2/2024:  Interpretation: No obstruction, no BD response, moderate restriction, decreased DLCO that normalized when corrected to lung volume.  FEV1: 1.21 L (56%)  FVC: 1.76 L (65%)  FEV1/FVC: 69%  TLC: 3.04 L (63%)  DLCO: 73%.        Assessment/Plan   Diagnoses and all orders for this visit:  Hemoptysis  Ddx includes local recurrence of carcinoid, granulation tissue post-surgery, infection, or due to cardiac disease (mitral valve disease vs CHF). Will get the CT scan of the chest from OSH for review, will arrange for bronchoscopy for airway exam, and will get TTE. Meanwhile, will start the patient on TXA po 1300 mg TID for 5 days.    -     Transthoracic Echo (TTE) Complete; Future  -     tranexamic acid (Lysteda) 650 mg tablet tablet; Take 2 tablets (1,300 mg) by mouth 3 times a day for 5 days.  -     Follow Up In Pulmonology; Future      Chronic obstructive pulmonary disease, unspecified COPD type (Multi)  -     Follow Up In Pulmonology      Subacute cough  There is no evidence of obstruction on PFTs to  suggest COPD. The symptoms of recurrent cough that gets worse at night suggest asthma vs GERD/Aspiration as the cause if the cough. Will start Advair inhaler, and will send the patient for SLP.  -     fluticasone propion-salmeteroL (Advair Diskus) 100-50 mcg/dose diskus inhaler; Inhale 1 puff 2 times a day. Rinse mouth with water after use to reduce aftertaste and incidence of candidiasis. Do not swallow.  -     SLP eval and treat; Future      Post-thoracotomy pain  -     lidocaine (Lidoderm) 5 % patch; Place 1 patch over 12 hours on the skin once daily. Remove & discard patch within 12 hours or as directed by MD.     RTC in 1 month after bronchoscopy.    Humphrey Blancas MD 07/29/24 3:13 PM

## 2024-07-29 NOTE — PATIENT INSTRUCTIONS
"Thanks for coming today!    Stop taking Spiriva.  Start using Advair Diskus inhaler.  Use tranexamic acid pills, use two pill every 8 hours for a total of 5 days.  This is meant to be used if you have bleeding.  You might only need to take it for one day and bleeding will stop.  Please schedule a swallowing evaluation test.  I will schedule bronchoscopy for you, one of our team members will contact you to schedule.  Will see you in the clinic in 1 month after the bronchoscopy.  The heart test is called an \"echocardiogram,\" which is like the breast ultrasound you had at some point.  The echocardiogram is meant to investigate the mitral valve (you had rheumatic fever) and how well your heart is pumping.  Sometimes people cough up blood if the heart is not pumping well and/or if the mitral valve is stiff (stenosis).  Try the lidocaine numbing patches to the skin where you have pain from the chest incisions.  "

## 2024-07-29 NOTE — Clinical Note
Stan/Daryl - I know that Dr. Blancas has reached out to you about this case.  I believe that request for outside imaging has been placed.  Patient will need another look at airway to see why there's bleeding. Dean Ramos MD

## 2024-08-05 ENCOUNTER — EVALUATION (OUTPATIENT)
Dept: SPEECH THERAPY | Facility: CLINIC | Age: 64
End: 2024-08-05
Payer: COMMERCIAL

## 2024-08-05 DIAGNOSIS — Z01.818 PRE-OP TESTING: ICD-10-CM

## 2024-08-05 DIAGNOSIS — R05.2 SUBACUTE COUGH: ICD-10-CM

## 2024-08-05 DIAGNOSIS — R13.13 PHARYNGEAL DYSPHAGIA: Primary | ICD-10-CM

## 2024-08-05 DIAGNOSIS — D3A.090 CARCINOID TUMOR DETERMINED BY BIOPSY OF LUNG (CMS-HCC): ICD-10-CM

## 2024-08-05 DIAGNOSIS — R04.2 HEMOPTYSIS: Primary | ICD-10-CM

## 2024-08-05 PROCEDURE — 92610 EVALUATE SWALLOWING FUNCTION: CPT | Mod: GN

## 2024-08-05 ASSESSMENT — ENCOUNTER SYMPTOMS
OCCASIONAL FEELINGS OF UNSTEADINESS: 1
DEPRESSION: 0
LOSS OF SENSATION IN FEET: 1

## 2024-08-05 ASSESSMENT — PAIN SCALES - GENERAL: PAINLEVEL_OUTOF10: 0 - NO PAIN

## 2024-08-05 ASSESSMENT — PAIN - FUNCTIONAL ASSESSMENT: PAIN_FUNCTIONAL_ASSESSMENT: 0-10

## 2024-08-05 NOTE — PROGRESS NOTES
Speech-Language Pathology    SLP Adult Outpatient Speech-Language Pathology Clinical Swallow Evaluation    Patient Name: Comfort Bliss  MRN: [id number]  Today's Date: 8/5/2024      Time Calculation  Start Time: 1310  Stop Time: 1355  Time Calculation (min): 45 min      Current Problem:   1. Pharyngeal dysphagia        2. Subacute cough  SLP eval and treat            Recommendations:  Risk for Aspiration: Yes  Solid Diet Recommendations : Regular (IDDSI Level 7)  Liquid Diet Recommendations: Thin (IDDSI Level 0)  Compensatory Swallowing Strategies: Decrease distractions during eating/feeding, Upright 90 degrees as possible for all oral intake, Remain upright for 20-30 minutes after meals, Alternate solids and liquids, Small bites/sips    Recommend MBSS and ENT evaluation to determine cause of cough. This will determine if speech therapy is needed and if so, what kind.     Assessment:  Prognosis: Fair  Barriers to Discharge: none  Treatment Provided: No  Medical Staff Made Aware: Yes  Strengths: Motivation  Barriers: None      Plan:  Inpatient/Swing Bed or Outpatient: Outpatient  SLP Plan:  (determine if skilled SLP is needed after MBSS and ENT evaluations)  Diet Recommendations: Liquid, Solid  Solid Consistency: Regular (IDDSI Level 7)  Liquid Consistency: Thin (IDDSI Level 0)  Discussed POC: Patient, Nursing  Discussed Risks/Benefits: Yes, Patient, Nursing  Patient/Caregiver Agreeable: Yes  SLP - OK to Discharge: Yes      Subjective   Comfort Bliss is a 64 y.o. female who received an evaluation d/t subacute cough. Comfort Bliss reported she coughs frequently during the day and at night d/t dry mouth. She reported that she drinks 1/5 water bottles of water a day and milk with dinner. She reported the dry mouth began after her surgery in May. She reported she coughs at night as well. She quit smoking 4 years ago.     General Visit Information:  Patient Class: Outpatient  Ordering Physician: Dean Ramos MD  Reason for  Referral: Swallow Evaluation; Rule out aspiration  Past Medical History Relevant to Rehab: HTN, vertigo, diabetes, obesity, GERD, lung nodule, COPD  Patient Seen During This Visit: Yes  Number of Authorized Treatments : EVAL ONLY  Total Number of Visits : 1  Reviewed Procedures and Risks: Yes  Date of Onset: 07/29/24  [employer] Diet: regular;thin  Current Diet : regular;thin  Dysphagia Diagnosis: Mild pharyngeal stage dysphagia        Objective   Short Term Goals:  Comfort Bliss will complete MBSS to determine if aspiration is occurring.  GOAL START: 8/5/24  ANT. END: 9/5/24  GOAL END:  Comfort Bliss will complete ENT evaluation to determine if any structural issues are causing the cough.    GOAL START: 8/5/24  ANT. END: 9/5/24  GOAL END:    Baseline Assessment:  Respiratory Status: Room air  Behavior/Cognition: Alert, Cooperative, Pleasant mood  Vision: Functional for self-feeding  Hearing: Within Functional Limits  Patient Positioning: Upright in Chair  Baseline Vocal Quality: Normal      Pain:  Pain Assessment: 0-10  0-10 (Numeric) Pain Score: 0 - No pain       Oral/Motor Assessment:  Oral Hygiene: WFL  Dentition: Dentures (Upper Partial )  Oral Motor: Within Functional Limits  Facial Sensation: Within Functional Limits  Facial Symmetry: Within Functional Limits  Labial Agility: Within Functional Limits  Labial ROM: Within Functional Limits  Labial Strength: Within Functional Limits  Labial Symmetry: Within Functional Limits  Lingual Agility: Within Functional Limits  Lingual ROM: Within Functional Limits  Lingual Strength: Within Functional Limits  Lingual Symmetry: Within Functional Limits  Palatal Elevation: Within Functional Limits  Vocal Quality: Within Functional Limits  Intelligibility: Intelligible  Breath Support: Adequate for speech  Hearing: Within Functional Limits      Consistencies Trialed:  Consistencies Trialed: Yes  Consistencies Trialed: Thin (IDDSI Level 0) - Straw, Pureed/extremely thick (IDDSI  Level 4), Soft & bite sized/chopped (IDDSI Level 6), Regular (IDDSI Level 7)      Clinical Observations:  Patient Positioning: Upright in Chair  Management of Oral Secretions: Adequate  Delayed Cough: Thin (IDDSI Level 0) - Straw, Pureed/Extremely Thick (IDDSI Level 4), Soft & Bite Sized/Chopped (IDDSI Level 6), Regular (IDDSI Level 7)    Pt completed PO trials of the following:   -thin liquids (cold) single sips via straw pt fed: No s/s of aspiration/penetration or vocal quality change in 20% of trials. Pt had a delayed cough after 80% of trials that persisted throughout the remainder of the session.   -thin liquids (room temperature) single sips via straw pt fed: No s/s of aspiration/penetration or vocal quality change in 50% of trials. Delayed cough after 50% of trials. She reported the room temperature water tasted bad and left an unsettled feeling in her stomach.   -puree via level teaspoon pt fed: No s/s of aspiration/penetration or vocal quality change in 70% of trials. One delayed cough after trials. Pt reported that the apple sauce tasted nasty.   -soft and bite size via level teaspoon pt fed: No s/s of aspiration/penetration or vocal quality change in 80% of trials. MILD residue cleared with liquid wash. Delayed cough after 20% of trials.   -regular 1/2 hussain cracker pt fed: No s/s of aspiration/penetration or vocal quality change in 0% of trials. MILD residue cleared with liquid wash. Pt coughed while masticating the hussain cracker.     SLP provided education on safe swallow strategies:  -Alternate sips and bites   -Small bites/sips   -Sit upright while eating   -Remain sitting upright for 15-20 minutes after eating  -Reduce distractions while eating    SLP provided a handout with safe swallow strategies and dry mouth strategies.     SLP Outcome Measures:  Cough Severity Index  My cough is worse when I lie down.: 4  My coughing problem causes me to restrict my personal and social life.: 0  I tend to  "avoid places because of my coughing problem.: 0  I feel embarassed because of my coughing problem.: 4  People ask \"What's wrong\" because I cough a lot.: 0  I run out of air when I cough.: 0  My coughing problem affects my voice.: 3  My coughing problem limits my physical activity.: 3  My coughing problem upsets me.: 4  People ask me if I am sick because I cough a lot.: 0  CSI Total Score: 18      EAT 10  My swallowing problem has caused me to lose weight.: 2  My swallowing problem interferes with my ability to go out for meals.: 0  Swallowing liquids takes extra effort.: 0  Swallowing solids takes extra effort.: 2  Swallowing pills takes extra effort.: 3  Swallowing is painful: 0  The pleasure of eating is affected by my swallowing.: 3 (food taste bad)  When I swallow food sticks in my throat.: 0  I cough when I eat.: 2  Swallowing is stressful: 0  EAT-10 TOTAL SCORE:: 12       Outpatient Education:  Adult Outpatient Education  Individual(s) Educated: Patient  Written Education : Safe Swallow Strategies; dry mouth mitigation strategies  Risk and Benefits Discussed with Patient/Caregiver/Other: yes  Patient/Caregiver Demonstrated Understanding: yes  Plan of Care Discussed and Agreed Upon: yes  Patient Response to Education: Patient/Caregiver Verbalized Understanding of Information             "

## 2024-08-05 NOTE — PROGRESS NOTES
Bronchoscopy Scheduling Request    Pre-bronchoscopy visit: Not needed   Please schedule procedure: Next available    Cytology on-site:  No  Location:  Either location  Performing physician:  General bronchoscopist  Referring physician:  MD Humphrey Warren MD, Richelle Esteves, APRN-CNP  Indication:  Hemoptysis, recent resection for carcinoid tumor  Sedation / Anesthesia:  GA  Procedure:  Airway exam  Time:  Tier 1  Fluorscopy:   No  Imaging needed:  None  Labs:  CBC, BMP  Meds:  None  Special Considerations:  None  Reviewed by:  Austyn Peace MD

## 2024-08-14 ENCOUNTER — HOSPITAL ENCOUNTER (OUTPATIENT)
Dept: CARDIOLOGY | Facility: HOSPITAL | Age: 64
Discharge: HOME | End: 2024-08-14
Payer: COMMERCIAL

## 2024-08-14 ENCOUNTER — LAB (OUTPATIENT)
Dept: LAB | Facility: LAB | Age: 64
End: 2024-08-14
Payer: COMMERCIAL

## 2024-08-14 DIAGNOSIS — C79.9 SECONDARY MALIGNANT NEOPLASM OF UNSPECIFIED SITE (CODE) (MULTI): ICD-10-CM

## 2024-08-14 DIAGNOSIS — R04.2 HEMOPTYSIS: ICD-10-CM

## 2024-08-14 DIAGNOSIS — Z01.818 PRE-OP TESTING: ICD-10-CM

## 2024-08-14 DIAGNOSIS — D3A.090 CARCINOID TUMOR DETERMINED BY BIOPSY OF LUNG (CMS-HCC): ICD-10-CM

## 2024-08-14 LAB
ANION GAP SERPL CALC-SCNC: 11 MMOL/L (ref 10–20)
AORTIC VALVE MEAN GRADIENT: 7 MMHG
AORTIC VALVE PEAK VELOCITY: 1.78 M/S
AV PEAK GRADIENT: 12.7 MMHG
AVA (PEAK VEL): 1.87 CM2
AVA (VTI): 1.6 CM2
BUN SERPL-MCNC: 10 MG/DL (ref 6–23)
CALCIUM SERPL-MCNC: 9.1 MG/DL (ref 8.6–10.3)
CHLORIDE SERPL-SCNC: 104 MMOL/L (ref 98–107)
CO2 SERPL-SCNC: 26 MMOL/L (ref 21–32)
CREAT SERPL-MCNC: 0.63 MG/DL (ref 0.5–1.05)
EGFRCR SERPLBLD CKD-EPI 2021: >90 ML/MIN/1.73M*2
EJECTION FRACTION APICAL 4 CHAMBER: 57.1
EJECTION FRACTION: 63 %
ERYTHROCYTE [DISTWIDTH] IN BLOOD BY AUTOMATED COUNT: 15.3 % (ref 11.5–14.5)
GLUCOSE SERPL-MCNC: 104 MG/DL (ref 74–99)
HCT VFR BLD AUTO: 35.7 % (ref 36–46)
HGB BLD-MCNC: 10.9 G/DL (ref 12–16)
LEFT ATRIUM VOLUME AREA LENGTH INDEX BSA: 15.5 ML/M2
LEFT VENTRICLE INTERNAL DIMENSION DIASTOLE: 4.07 CM (ref 3.5–6)
LEFT VENTRICULAR OUTFLOW TRACT DIAMETER: 1.7 CM
LV EJECTION FRACTION BIPLANE: 63 %
MCH RBC QN AUTO: 28.5 PG (ref 26–34)
MCHC RBC AUTO-ENTMCNC: 30.5 G/DL (ref 32–36)
MCV RBC AUTO: 94 FL (ref 80–100)
MITRAL VALVE E/A RATIO: 0.77
NRBC BLD-RTO: 0 /100 WBCS (ref 0–0)
PLATELET # BLD AUTO: 131 X10*3/UL (ref 150–450)
POTASSIUM SERPL-SCNC: 4.1 MMOL/L (ref 3.5–5.3)
RBC # BLD AUTO: 3.82 X10*6/UL (ref 4–5.2)
RIGHT VENTRICLE PEAK SYSTOLIC PRESSURE: 28 MMHG
SODIUM SERPL-SCNC: 137 MMOL/L (ref 136–145)
TRICUSPID ANNULAR PLANE SYSTOLIC EXCURSION: 2.2 CM
WBC # BLD AUTO: 2.7 X10*3/UL (ref 4.4–11.3)

## 2024-08-14 PROCEDURE — 85027 COMPLETE CBC AUTOMATED: CPT

## 2024-08-14 PROCEDURE — 93306 TTE W/DOPPLER COMPLETE: CPT

## 2024-08-14 PROCEDURE — 93306 TTE W/DOPPLER COMPLETE: CPT | Performed by: STUDENT IN AN ORGANIZED HEALTH CARE EDUCATION/TRAINING PROGRAM

## 2024-08-14 PROCEDURE — 80048 BASIC METABOLIC PNL TOTAL CA: CPT

## 2024-08-14 PROCEDURE — 36415 COLL VENOUS BLD VENIPUNCTURE: CPT

## 2024-08-22 ENCOUNTER — HOSPITAL ENCOUNTER (OUTPATIENT)
Dept: GASTROENTEROLOGY | Facility: HOSPITAL | Age: 64
Setting detail: OUTPATIENT SURGERY
Discharge: HOME | End: 2024-08-22
Payer: COMMERCIAL

## 2024-08-22 ENCOUNTER — ANESTHESIA EVENT (OUTPATIENT)
Dept: GASTROENTEROLOGY | Facility: HOSPITAL | Age: 64
End: 2024-08-22
Payer: COMMERCIAL

## 2024-08-22 ENCOUNTER — ANESTHESIA (OUTPATIENT)
Dept: GASTROENTEROLOGY | Facility: HOSPITAL | Age: 64
End: 2024-08-22
Payer: COMMERCIAL

## 2024-08-22 VITALS
SYSTOLIC BLOOD PRESSURE: 105 MMHG | HEART RATE: 78 BPM | TEMPERATURE: 97.9 F | OXYGEN SATURATION: 95 % | RESPIRATION RATE: 18 BRPM | DIASTOLIC BLOOD PRESSURE: 60 MMHG

## 2024-08-22 DIAGNOSIS — R04.2 HEMOPTYSIS: ICD-10-CM

## 2024-08-22 DIAGNOSIS — R91.8 RIGHT LOWER LOBE LUNG MASS: ICD-10-CM

## 2024-08-22 DIAGNOSIS — D3A.090 CARCINOID TUMOR DETERMINED BY BIOPSY OF LUNG (CMS-HCC): ICD-10-CM

## 2024-08-22 LAB — GLUCOSE BLD MANUAL STRIP-MCNC: 94 MG/DL (ref 74–99)

## 2024-08-22 PROCEDURE — 82947 ASSAY GLUCOSE BLOOD QUANT: CPT

## 2024-08-22 PROCEDURE — 7100000001 HC RECOVERY ROOM TIME - INITIAL BASE CHARGE

## 2024-08-22 PROCEDURE — 31622 DX BRONCHOSCOPE/WASH: CPT | Performed by: INTERNAL MEDICINE

## 2024-08-22 PROCEDURE — 7100000010 HC PHASE TWO TIME - EACH INCREMENTAL 1 MINUTE

## 2024-08-22 PROCEDURE — 3700000002 HC GENERAL ANESTHESIA TIME - EACH INCREMENTAL 1 MINUTE

## 2024-08-22 PROCEDURE — 2500000004 HC RX 250 GENERAL PHARMACY W/ HCPCS (ALT 636 FOR OP/ED): Mod: SE

## 2024-08-22 PROCEDURE — 3700000001 HC GENERAL ANESTHESIA TIME - INITIAL BASE CHARGE

## 2024-08-22 PROCEDURE — 2500000005 HC RX 250 GENERAL PHARMACY W/O HCPCS: Mod: SE

## 2024-08-22 PROCEDURE — 7100000009 HC PHASE TWO TIME - INITIAL BASE CHARGE

## 2024-08-22 PROCEDURE — 7100000002 HC RECOVERY ROOM TIME - EACH INCREMENTAL 1 MINUTE

## 2024-08-22 RX ORDER — SODIUM CHLORIDE, SODIUM LACTATE, POTASSIUM CHLORIDE, CALCIUM CHLORIDE 600; 310; 30; 20 MG/100ML; MG/100ML; MG/100ML; MG/100ML
100 INJECTION, SOLUTION INTRAVENOUS CONTINUOUS
OUTPATIENT
Start: 2024-08-22

## 2024-08-22 RX ORDER — ROCURONIUM BROMIDE 10 MG/ML
INJECTION, SOLUTION INTRAVENOUS AS NEEDED
Status: DISCONTINUED | OUTPATIENT
Start: 2024-08-22 | End: 2024-08-22

## 2024-08-22 RX ORDER — PROPOFOL 10 MG/ML
INJECTION, EMULSION INTRAVENOUS AS NEEDED
Status: DISCONTINUED | OUTPATIENT
Start: 2024-08-22 | End: 2024-08-22

## 2024-08-22 RX ORDER — ONDANSETRON HYDROCHLORIDE 2 MG/ML
INJECTION, SOLUTION INTRAVENOUS AS NEEDED
Status: DISCONTINUED | OUTPATIENT
Start: 2024-08-22 | End: 2024-08-22

## 2024-08-22 RX ORDER — ONDANSETRON HYDROCHLORIDE 2 MG/ML
4 INJECTION, SOLUTION INTRAVENOUS ONCE AS NEEDED
OUTPATIENT
Start: 2024-08-22

## 2024-08-22 RX ORDER — FENTANYL CITRATE 50 UG/ML
12.5 INJECTION, SOLUTION INTRAMUSCULAR; INTRAVENOUS EVERY 5 MIN PRN
OUTPATIENT
Start: 2024-08-22

## 2024-08-22 RX ORDER — ALBUTEROL SULFATE 0.83 MG/ML
2.5 SOLUTION RESPIRATORY (INHALATION) ONCE AS NEEDED
OUTPATIENT
Start: 2024-08-22

## 2024-08-22 RX ORDER — SODIUM CHLORIDE, SODIUM LACTATE, POTASSIUM CHLORIDE, CALCIUM CHLORIDE 600; 310; 30; 20 MG/100ML; MG/100ML; MG/100ML; MG/100ML
INJECTION, SOLUTION INTRAVENOUS CONTINUOUS PRN
Status: DISCONTINUED | OUTPATIENT
Start: 2024-08-22 | End: 2024-08-22

## 2024-08-22 RX ORDER — LABETALOL HYDROCHLORIDE 5 MG/ML
5 INJECTION, SOLUTION INTRAVENOUS ONCE AS NEEDED
OUTPATIENT
Start: 2024-08-22

## 2024-08-22 RX ORDER — FENTANYL CITRATE 50 UG/ML
INJECTION, SOLUTION INTRAMUSCULAR; INTRAVENOUS AS NEEDED
Status: DISCONTINUED | OUTPATIENT
Start: 2024-08-22 | End: 2024-08-22

## 2024-08-22 RX ORDER — DROPERIDOL 2.5 MG/ML
0.62 INJECTION, SOLUTION INTRAMUSCULAR; INTRAVENOUS ONCE AS NEEDED
OUTPATIENT
Start: 2024-08-22

## 2024-08-22 RX ORDER — HYDROMORPHONE HYDROCHLORIDE 1 MG/ML
0.2 INJECTION, SOLUTION INTRAMUSCULAR; INTRAVENOUS; SUBCUTANEOUS EVERY 5 MIN PRN
OUTPATIENT
Start: 2024-08-22

## 2024-08-22 RX ORDER — LIDOCAINE HCL/PF 100 MG/5ML
SYRINGE (ML) INTRAVENOUS AS NEEDED
Status: DISCONTINUED | OUTPATIENT
Start: 2024-08-22 | End: 2024-08-22

## 2024-08-22 RX ORDER — HYDROMORPHONE HYDROCHLORIDE 1 MG/ML
0.5 INJECTION, SOLUTION INTRAMUSCULAR; INTRAVENOUS; SUBCUTANEOUS EVERY 5 MIN PRN
OUTPATIENT
Start: 2024-08-22

## 2024-08-22 RX ORDER — LIDOCAINE HYDROCHLORIDE 10 MG/ML
0.1 INJECTION, SOLUTION EPIDURAL; INFILTRATION; INTRACAUDAL; PERINEURAL ONCE
OUTPATIENT
Start: 2024-08-22 | End: 2024-08-22

## 2024-08-22 ASSESSMENT — PAIN SCALES - GENERAL
PAINLEVEL_OUTOF10: 0 - NO PAIN
PAIN_LEVEL: 0
PAINLEVEL_OUTOF10: 0 - NO PAIN

## 2024-08-22 ASSESSMENT — COLUMBIA-SUICIDE SEVERITY RATING SCALE - C-SSRS
2. HAVE YOU ACTUALLY HAD ANY THOUGHTS OF KILLING YOURSELF?: NO
6. HAVE YOU EVER DONE ANYTHING, STARTED TO DO ANYTHING, OR PREPARED TO DO ANYTHING TO END YOUR LIFE?: NO
1. IN THE PAST MONTH, HAVE YOU WISHED YOU WERE DEAD OR WISHED YOU COULD GO TO SLEEP AND NOT WAKE UP?: NO

## 2024-08-22 ASSESSMENT — PAIN - FUNCTIONAL ASSESSMENT
PAIN_FUNCTIONAL_ASSESSMENT: 0-10

## 2024-08-22 NOTE — ANESTHESIA PROCEDURE NOTES
Airway  Date/Time: 8/22/2024 12:29 PM  Urgency: elective    Airway not difficult    Staffing  Performed: VIVIEN   Authorized by: Mariano Strauss MD    Performed by: Wanda Estrella  Patient location during procedure: OR    Indications and Patient Condition  Indications for airway management: anesthesia  Spontaneous Ventilation: absent  Sedation level: deep  Preoxygenated: yes  Patient position: sniffing  Mask difficulty assessment: 1 - vent by mask  Planned trial extubation    Final Airway Details  Final airway type: endotracheal airway      Successful airway: ETT  Cuffed: yes   Successful intubation technique: video laryngoscopy  Facilitating devices/methods: intubating stylet  Endotracheal tube insertion site: oral  Blade: Mely  Blade size: #3  ETT size (mm): 8.5  Cormack-Lehane Classification: grade I - full view of glottis  Placement verified by: chest auscultation and capnometry   Measured from: lips  ETT to lips (cm): 21  Number of attempts at approach: 1    Additional Comments  Lopez 3

## 2024-08-22 NOTE — ANESTHESIA PREPROCEDURE EVALUATION
Patient: Comfort Bliss    Procedure Information       Date/Time: 08/22/24 1030    Scheduled providers: Chanelle GRAHAM MD; Mariano Strauss MD    Procedure: BRONCHOSCOPY    Location: [facility]            Relevant Problems   Cardiac   (+) Essential (primary) hypertension      Pulmonary   (+) COPD (chronic obstructive pulmonary disease) (Multi)   (+) Centrilobular emphysema (Multi)      GI   (+) Gastroesophageal reflux disease without esophagitis   (+) Pharyngeal dysphagia      /Renal   (+) Acute UTI      Endocrine   (+) Diabetes mellitus, type 2 (Multi)      ID   (+) Acute UTI   (+) COVID-19       Clinical information reviewed:   Tobacco  Allergies  Meds   Med Hx  Surg Hx  OB Status  Fam Hx  Soc   Hx        NPO Detail:  NPO/Void Status  Date of Last Liquid: 08/21/24  Time of Last Liquid: 2300  Date of Last Solid: 08/21/24  Time of Last Solid: 2200  Last Intake Type: Clear fluids  Time of Last Void: 0959         Physical Exam    Airway  Mallampati: II  TM distance: >3 FB  Neck ROM: full     Cardiovascular - normal exam     Dental   (+) lower dentures, upper dentures     Pulmonary - normal exam     Abdominal - normal exam             Anesthesia Plan    History of general anesthesia?: yes  History of complications of general anesthesia?: no    ASA 3     general     Anesthetic plan and risks discussed with patient.  Use of blood products discussed with patient who consented to blood products.    Plan discussed with CRNA.

## 2024-08-22 NOTE — LETTER
Dear, Comfort Bliss      8/8/2024                                              INFORMATION FOR YOUR PROCEDURE     INSTRUCTIONS MUST BE FOLLOWED OR YOU RUN THE RISK OF YOUR CASE BEING CANCELED     Information is attached to this e-mail for your upcoming procedure. (Look for the paperclip in your email to access this information)  Lab requisitions are also included as well as procedural instructions.    You are scheduled for your Bronchoscopy on 8/22/24,     with Dr. Chanelle Santa    Arrival is at  9:30  AM,  for Check In prior to your actual procedure time. This allows us to prepare you for your procedure.  Location:   [place] [address]   Bronchoscopy / Endoscopy Suite   [facility] Room 1300.     Located on the 1st Floor close to the Main entrance of the hospital.  Enter through the front doors, turn left and we are the 1st hallway on the left hand side.(Room 1300 [facility]).  If you park in the visitor's garage you will come in on the second floor and will need to make your way down to the 1st level.     Patient information is right there if assistance is needed.    NPO (No food or drink) after midnight the night before your procedure. This includes coffee, water, and soda, hard candy, gum or mints.  If taking your morning medications, a small sip of water is allowed to get the medication down.    For your safety, you must have a responsible, adult  accompany you to your procedure.  You will not be permitted to drive yourself home if you have received any type of anesthesia or sedation.    Automated calls about your upcoming scheduled appointments can be quite confusing for patients.    The times may vary depending on what you have scheduled for procedure day. Follow the time/s I have given you  so there is no confusion.    Blood work will need to be done prior to your procedure, preferably at a  Facility.  There are no  restrictions for testing. I have attached a requisition for you.    Please reach out to me with any questions you may have Martita @ [phone] or   Jose Angel @ [phone]    Have a nice day!    Martita Holland    Bronchoscopy   Interventional Pulmonology    MD Soco Allen MD Sameer Avasarala, MD Catalina Teba, MD Andrew Dunatchik, MD        [facility]  Pulmonary, Critical Care and Sleep Medicine  [address] [place] [address] [phone]   [phone]  [email or web address]".[facility]

## 2024-08-22 NOTE — ANESTHESIA POSTPROCEDURE EVALUATION
Patient: Comfort Bliss    Procedure Summary       Date: 08/22/24 Room / Location: [facility]    Anesthesia Start: 1217 Anesthesia Stop: 1307    Procedure: BRONCHOSCOPY Diagnosis:       Hemoptysis      Carcinoid tumor determined by biopsy of lung (CMS-HCC)    Scheduled Providers: Chanelle GRAHAM MD; Mariano Strauss MD Responsible Provider: Mariano Strauss MD    Anesthesia Type: general ASA Status: 3            Anesthesia Type: general    Vitals Value Taken Time   /62 08/22/24 1335   Temp 36.6 °C (97.9 °F) 08/22/24 1305   Pulse 78 08/22/24 1335   Resp 18 08/22/24 1335   SpO2 96 % 08/22/24 1335       Anesthesia Post Evaluation    Patient location during evaluation: PACU  Patient participation: complete - patient participated  Level of consciousness: awake and alert  Pain score: 0  Pain management: adequate  Multimodal analgesia pain management approach  Airway patency: patent  Cardiovascular status: acceptable  Respiratory status: acceptable  Hydration status: acceptable  Postoperative Nausea and Vomiting: none        There were no known notable events for this encounter.

## 2024-08-22 NOTE — DISCHARGE INSTRUCTIONS
The anesthetics, sedatives or narcotics which were given to you today will be acting in your body for the next 24 hours, so you might feel a little sleepy or groggy. This feeling should slowly wear off.   Carefully read and follow the instructions below:   You received sedation today.   Do not drive or operate machinery or power tools of any kind.   No alcoholic beverages today, not even beer or wine.   No over the counter medications that contain alcohol or may cause drowsiness.   Do not make important decisions or sign legal documents.     Do not use Aspirin containing products or non-steroidal medications for the next 24 hours.  (Examples of these types of medications include: Advil, Aleve, Ecotrin, Ibuprofen, Motrin or Naprosyn.  This list is not all-inclusive.    Tylenol, cough medicine, cough drops or throat lozenges may be used when you are allowed to resume eating and drinking.     Call your physician if any of these symptoms occur:   High fever over 101 degrees or chills (a low grade fever is common for 24 hours)   Rash or hives   Persistent nausea or vomiting   Inability to urinate within 8 hours after the procedure  Go directly to the emergency room if you notice any of the following:   Shortness of breath   Chest pain  Coughing up large amounts of bright red blood greater than a teaspoonful of blood clots (about a teaspoonful for the next 24-48 hours is normal, especially if you had a biopsy)  Resume all normal medications unless directed otherwise by your doctor.     Your doctor recommends these additional instructions:    Follow up with your primary care doctor.  We are referring you to ENT.    Follow up with your referring physician as previously scheduled.    If you experience any problems or have any questions following discharge, please call:   Before 5 pm: [phone]   After 5pm and on weekends: [phone] / [phone] and ask for the Pulmonary Fellow on-call (Pager Number: [phone])

## 2024-08-26 ENCOUNTER — TELEPHONE (OUTPATIENT)
Dept: PULMONOLOGY | Facility: HOSPITAL | Age: 64
End: 2024-08-26
Payer: COMMERCIAL

## 2024-08-26 NOTE — TELEPHONE ENCOUNTER
Spoke with pt's son, Rocio, regarding pt's nebulizer machine. He stated that the pt hasn't received the machine yet. He stated that he is unsure of the DME it was going to be sent to. Order for nebulizer machine was sent to [facility] via [place]. Confirmation of order was received.

## 2024-08-27 DIAGNOSIS — R35.89 OTHER POLYURIA: ICD-10-CM

## 2024-08-28 ENCOUNTER — TELEPHONE (OUTPATIENT)
Dept: PULMONOLOGY | Facility: HOSPITAL | Age: 64
End: 2024-08-28
Payer: COMMERCIAL

## 2024-08-28 NOTE — TELEPHONE ENCOUNTER
Spoke with pt's son regarding if pt received her nebulizer machine. He stated that [facility] called this morning to set up delivery. He stated that they will be delivering it today. He was instructed to call the office if he had any other questions or concerns. He verbalized understanding.

## 2024-09-25 ENCOUNTER — HOSPITAL ENCOUNTER (OUTPATIENT)
Dept: RADIOLOGY | Facility: HOSPITAL | Age: 64
Discharge: HOME | End: 2024-09-25
Payer: COMMERCIAL

## 2024-09-25 DIAGNOSIS — R13.13 PHARYNGEAL DYSPHAGIA: ICD-10-CM

## 2024-09-25 DIAGNOSIS — R05.2 SUBACUTE COUGH: ICD-10-CM

## 2024-09-25 PROCEDURE — 74230 X-RAY XM SWLNG FUNCJ C+: CPT

## 2024-09-25 PROCEDURE — 2500000005 HC RX 250 GENERAL PHARMACY W/O HCPCS

## 2024-09-25 PROCEDURE — 92611 MOTION FLUOROSCOPY/SWALLOW: CPT | Mod: GN

## 2024-09-25 PROCEDURE — 74230 X-RAY XM SWLNG FUNCJ C+: CPT | Performed by: RADIOLOGY

## 2024-09-25 NOTE — PROGRESS NOTES
Speech-Language Pathology    Adult Outpatient Modified Barium Swallow Study    Patient Name: Comfort Bliss  MRN: [id number]  : 1960  Today's Date: 24    Time Calculation  Start Time: 830  Stop Time: 0848  Total Time (min): 18 minutes       Modified Barium Swallow Study completed. Informed verbal consent obtained prior to completion of exam. Trials of thin, nectar/mildly thick liquid, honey/moderately thick liquid, puree, regular solids and barium tablet with thin liquid were given.     SLP: Melany Winston SLP   Contact info: [employer] secure chat      Reason for Referral: subacute cough  Patient Hx: lung nodule, diabetes type 2, vertigo, obesity, GERD, HTN, COPD, abnormal gastrointestinal pet scan, pharyngeal dysphagia, carcinoid tumor determined by biopsy of lung. Comfort Bliss received a bedside swallow evaluation in outpatient setting on 24. At that time Comfort Bliss had delayed cough on thin liquids 80% of the time when cold and 50% of the time when room temperature.  SLP recommended MBSS to determine cause of cough. Comfort Bliss still c/o dry mouth although reported cough has improved. She reported she does not cough at night anymore. She reported that she takes small bites and sips and consumes solids and liquids slowly. She drinks 1-5 bottles of water a day and drinks milk with dinner. She quit smoking 4 years ago.       Respiratory Status: Room air  Current diet: Regular textures (IDDSI level 7) and Thin liquids (IDDSI level 0)    Pain:  Pain Scale: 0-10  Ratin  Location: N/A  Type: N/A    Fall Risk: Yes      FINAL SPEECH RECOMMENDATIONS    DIET   Solids: Regular textures (IDDSI level 7)  Liquids: Thin liquids (IDDSI level 0)    Based on the results of the MBSS, the above diet is recommended with the implementation of the following safe swallow strategies:      STRATEGIES:  - Small bites  - Small, single sips  - Alternate consistencies  - Effortful swallow  - Add moisture to dry foods  - Upright for  all PO intake      Plan:  SLP Plan: No treatment needs identified at this time       Discussed POC: Patient  Discussed Risks/Benefits: Yes  Patient/Caregiver Agreeable: Yes      Education Provided: Results and recommendations per MBSS. Verbal understanding and agreement given.    Treatment Provided Today: No    Additional consult suggested: No    Repeat study/ dc plan: No repeat study recommended at this time.        Mechanics of the Swallow Summary:  ORAL PHASE:  Lip Closure - WFL. No anterior spillage of bolus.   Bolus prep/mastication - Prolonged mastication with soft solids and regular solids. Piecemeal deglutition of pudding, soft solids, and regular solids.   Oral residue - Trace to Mild oral residue with all consistencies.    PHARYNGEAL PHASE:  Initiation of pharyngeal swallow - Swallow initiation at angle of the ramus  Soft palate elevation - adequate seal. None of the bolus entered the nasal cavity.   Laryngeal elevation - WFL.   Anterior hyoid excursion - WFL.  Epiglottic movement - Epiglottic inversion reduced. Epiglottis inverted d/t bolus passing and pushing it down.   Pharyngeal residue - Trace to mild residue with all consistencies.     ESOPHAGEAL PHASE:  Esophageal clearance - slow down in esophagus with minimal retrograde flow.       SLP Impressions with Severity Rating:   Pt presents with mild pharyngoesophageal dysphagia upon completion of modified barium swallow study this date. Swallowing physiology is detailed above.     Impairments most impacting swallowing safety and efficiency include reduced epiglottic inversion and slow esophageal emptying.     Patient demonstrated trace to mild oral and pharyngeal residue that was reduced with double swallows.    Strategies attempted:  -Multiple swallows      OUTCOME MEASURES:  Functional Oral Intake Scale  Functional Oral Intake Scale: Level 7        total oral diet with no restrictions       Eating Assessment Tool (EAT-10)   0 = No problem, 1 = Mild  problem, 2 = Mild to moderate problem, 3 = Moderate problem, 4 = Severe problem    EAT 10  My swallowing problem has caused me to lose weight.: 1  My swallowing problem interferes with my ability to go out for meals.: 0  Swallowing liquids takes extra effort.: 0  Swallowing solids takes extra effort.: 2  Swallowing pills takes extra effort.: 1  Swallowing is painful: 1  The pleasure of eating is affected by my swallowing.: 0  When I swallow food sticks in my throat.: 2  I cough when I eat.: 1  Swallowing is stressful: 1  EAT-10 TOTAL SCORE:: 9    A total score of 3 or above may indicate difficulty with swallowing safely and/or efficiently      Rosenbek's Penetration Aspiration Scale  Thin Liquids: 4. DEEP PENETRATION that clears - contrast contacts vocal cords, no residue with 5 mL of thin liquid via teaspoon d/t premature spillage. No aspiration and penetration with 2nd trial of 5 mL thin via teaspoon, 20 mL via medicine cup, and 60 mL via straw.   Nectar Thick Liquids: 1. NO ASPIRATION & NO PENETRATION - no aspiration, contrast does not enter airway  Honey Thick Liquids: 1. NO ASPIRATION & NO PENETRATION - no aspiration, contrast does not enter airway  Puree: 1. NO ASPIRATION & NO PENETRATION - no aspiration, contrast does not enter airway  Soft Solids: 1. NO ASPIRATION & NO PENETRATION - no aspiration, contrast does not enter airway  Solids: 1. NO ASPIRATION & NO PENETRATION - no aspiration, contrast does not enter airway

## 2024-11-08 NOTE — PROGRESS NOTES
"Subjective   Comfort Bliss  is a 64 y.o. female who presents for ***    Currently the patient is {Usual state of health:95215}. She {T report deny:19911}. {WILDorBLANK:36164::\" \"}    {CWT PM stuff:81892}    She  reports that she quit smoking about 4 years ago. Her smoking use included cigarettes. She has never used smokeless tobacco. She reports that she does not currently use alcohol. She reports that she does not use drugs.    Objective   Physical Exam  {CWT exam:63986::\" \"}  Diagnostic Studies  {CWT reviewed:65969}    Assessment/Plan   I believe that the patient {CWT doing well:02319}.     {CWT plan smart text:83639}    I recommend {CWTworkup:27236}    I discussed this in detail with the patient, including a discussion of alternatives. They were comfortable with this approach.     Sameer Tate MD  [phone]    "

## 2024-11-13 ENCOUNTER — APPOINTMENT (OUTPATIENT)
Dept: RADIOLOGY | Facility: CLINIC | Age: 64
End: 2024-11-13
Payer: COMMERCIAL

## 2024-11-13 ENCOUNTER — APPOINTMENT (OUTPATIENT)
Dept: SURGERY | Facility: CLINIC | Age: 64
End: 2024-11-13
Payer: COMMERCIAL

## 2024-11-13 ENCOUNTER — APPOINTMENT (OUTPATIENT)
Dept: RADIOLOGY | Facility: HOSPITAL | Age: 64
End: 2024-11-13
Payer: COMMERCIAL

## 2024-11-18 ENCOUNTER — OFFICE VISIT (OUTPATIENT)
Dept: PULMONOLOGY | Facility: HOSPITAL | Age: 64
End: 2024-11-18
Payer: COMMERCIAL

## 2024-11-18 VITALS
HEART RATE: 76 BPM | DIASTOLIC BLOOD PRESSURE: 73 MMHG | BODY MASS INDEX: 27.44 KG/M2 | WEIGHT: 150 LBS | TEMPERATURE: 97.6 F | OXYGEN SATURATION: 98 % | SYSTOLIC BLOOD PRESSURE: 109 MMHG

## 2024-11-18 DIAGNOSIS — R05.3 CHRONIC COUGH: ICD-10-CM

## 2024-11-18 DIAGNOSIS — R04.2 HEMOPTYSIS: Primary | ICD-10-CM

## 2024-11-18 PROCEDURE — 3074F SYST BP LT 130 MM HG: CPT | Performed by: STUDENT IN AN ORGANIZED HEALTH CARE EDUCATION/TRAINING PROGRAM

## 2024-11-18 PROCEDURE — 99214 OFFICE O/P EST MOD 30 MIN: CPT | Performed by: STUDENT IN AN ORGANIZED HEALTH CARE EDUCATION/TRAINING PROGRAM

## 2024-11-18 PROCEDURE — 4010F ACE/ARB THERAPY RXD/TAKEN: CPT | Performed by: STUDENT IN AN ORGANIZED HEALTH CARE EDUCATION/TRAINING PROGRAM

## 2024-11-18 PROCEDURE — 3078F DIAST BP <80 MM HG: CPT | Performed by: STUDENT IN AN ORGANIZED HEALTH CARE EDUCATION/TRAINING PROGRAM

## 2024-11-18 PROCEDURE — 99214 OFFICE O/P EST MOD 30 MIN: CPT | Mod: GC | Performed by: STUDENT IN AN ORGANIZED HEALTH CARE EDUCATION/TRAINING PROGRAM

## 2024-11-18 ASSESSMENT — PAIN SCALES - GENERAL: PAINLEVEL_OUTOF10: 0-NO PAIN

## 2024-11-18 NOTE — PATIENT INSTRUCTIONS
Thanks for coming to pulmonary clinic today.    Please complete CT scan of the chest ordered by thoracic surgery team.  You are doing great, continue to follow-up with your primary care physician.  Please schedule an appointment with pulmonary clinic as needed in the future.

## 2024-11-22 NOTE — PROGRESS NOTES
Subjective   Patient ID: Comfort Bliss is a 64 y.o. female who presents for FUV.  HPI  This is a 64-year-old female patient with past medical history of pulmonary carcinoid s/p robotic right lower lobe segmental resection on 5/3/24 . Patient presented to the clinic today for follow up for hemoptysis.     Since last visit, she had diagnostic bronchoscopy done in 8/2024 that showed no bleeding, and normal stump.    Today, patient mentioned that she is doing much better, no more hemoptysis, has mild productive cough with grayish sputum that is chronic, SOB is much better now, she feels much better overall.     She used to use Advair but the last refill went to her son's house and she did not get the chance to pick it up. She uses albuterol as need which she needs up to 4 times a day sometimes.     Review of Systems  As above.    Objective   Physical Exam  Constitutional:       General: She is awake.      Appearance: Normal appearance.   HENT:      Head: Normocephalic and atraumatic.      Nose: Nose normal.      Mouth/Throat:      Mouth: Mucous membranes are moist.   Eyes:      Pupils: Pupils are equal, round, and reactive to light.   Neck:      Thyroid: No thyroid mass.      Trachea: Phonation normal.   Cardiovascular:      Rate and Rhythm: Normal rate and regular rhythm.      Pulses: Normal pulses.   Pulmonary:      Effort: Pulmonary effort is normal. No respiratory distress.      Breath sounds: Normal air entry. No stridor. No decreased breath sounds.   Abdominal:      General: Bowel sounds are normal. There is no distension.      Palpations: Abdomen is soft.      Tenderness: There is no abdominal tenderness.   Musculoskeletal:      Cervical back: Neck supple.      Right lower leg: No edema.      Left lower leg: No edema.   Skin:     General: Skin is warm.      Capillary Refill: Capillary refill takes less than 2 seconds.   Neurological:      General: No focal deficit present.      Mental Status: She is alert and  oriented to person, place, and time. Mental status is at baseline.      Cranial Nerves: Cranial nerves 2-12 are intact.      Motor: Motor function is intact.   Psychiatric:         Attention and Perception: Attention and perception normal.         Mood and Affect: Mood normal.         Speech: Speech normal.         Behavior: Behavior normal.       CT scan of the chest 7/2024:  1. Wedge resection changes in the right lower lobe without CT findings of local recurrent or metastatic disease in the thorax.   2. Trace right pleural effusion.   3. Diffuse heterogeneity of the liver parenchyma is favored to relate to heterogeneous hepatic steatosis.   4. Spleen is enlarged, similar to prior exam.   RPS/alt         TTE 8/2024:  CONCLUSIONS:   1. Left ventricular ejection fraction is normal, calculated by Sanchez's biplane at 63%.   2. There is normal right ventricular global systolic function.         PFTs 2/2024:  Interpretation: no obstruction, restrictive pattern, with decreased DLCO.      Assessment/Plan   Diagnoses and all orders for this visit:  Hemoptysis  It resolved now, bronchoscopy in 8/2024 showed no active bleeding and normal stump. With normal CT of the chest  previously. No indications for further evaluation at this time. Patient was advised to keep an eye for a recurrence of the hemoptysis.       Chronic cough  Productive with grayish sputum, improved since before, PFTs with no obstruction but CT of the chest showed emphysema. SLP evaluation showed no aspiration.  She will restart using Advair once she gets it from her son's home (it was sent there by mistake), she has albuterol to be used as needed.       RTC as needed.    Humphrey Blancas MD 11/21/24 10:41 PM

## 2024-11-26 NOTE — PROGRESS NOTES
Subjective   Comfort Bliss  is a 64 y.o. female who presents for evaluation of newly diagnosed right lower lobe carcinoid cancer status post robotic right lower lobe segmental resection on 5/3/24.     This patient received radiographic imaging in the setting of COPD.  This identified a pulmonary nodule.  January 2024, a right lower lobe nodule grew from 12 x 9 mm to 17 x 10 mm.  She was referred for bronchoscopy procedure which was performed on 3/7/2024 and the right lower lobe biopsy was consistent with carcinoid tumor (Ki-67 proliferative index is low 2%). She received surgery on 5/3 and did well post-operatively. 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 She reports that she is improving.  She denies the following symptoms: chest pain, shortness of breath at rest, shortness of breath with activity, cough, hemoptysis, fevers, chills, and weight loss.      There have been no significant changes to their documented medical, surgical and family history.     She  reports that she quit smoking about 4 years ago. Her smoking use included cigarettes. She has never used smokeless tobacco. She reports that she does not currently use alcohol. She reports that she does not use drugs.    Objective   Physical Exam  Phone visit  Diagnostic Studies  === 11/27/24 ===    CT CHEST WO IV CONTRAST    - Impression -  1. Postsurgical changes the right lower lobe status post wedge  resection of previously seen nodule. No evidence of disease  recurrence along the surgical bed.  2. Redemonstration of several lung nodules predominantly in the right  lower lobe measuring up to 3 mm, unchanged from the prior. No new  suspicious nodules seen. Attention on short-term follow-up study  advised.    MACRO:  None    Signed by: Silas Rod 11/29/2024 6:34 PM  Dictation workstation:    [id number]    Assessment/Plan   I believe that the patient has no evidence of cancer recurrence.     Based on the patient's clinical presentation and my review of their radiographic imaging, I believe they have no evidence of cancer recurrence.  I recommend ongoing radiographic screening.    I recommend CT chest in 6 months (phone call or [facility])    I discussed this in detail with the patient, including a discussion of alternatives. They were comfortable with this approach.     Sameer Tate MD  [phone]  Time 5 min

## 2024-11-27 ENCOUNTER — HOSPITAL ENCOUNTER (OUTPATIENT)
Dept: RADIOLOGY | Facility: HOSPITAL | Age: 64
Discharge: HOME | End: 2024-11-27
Payer: COMMERCIAL

## 2024-11-27 DIAGNOSIS — C34.31 MALIGNANT NEOPLASM OF LOWER LOBE OF RIGHT LUNG (MULTI): ICD-10-CM

## 2024-11-27 PROCEDURE — 71250 CT THORAX DX C-: CPT

## 2024-11-27 PROCEDURE — 71250 CT THORAX DX C-: CPT | Performed by: RADIOLOGY

## 2024-12-04 ENCOUNTER — TELEMEDICINE (OUTPATIENT)
Dept: SURGERY | Facility: CLINIC | Age: 64
End: 2024-12-04
Payer: COMMERCIAL

## 2024-12-04 DIAGNOSIS — D3A.090 CARCINOID TUMOR DETERMINED BY BIOPSY OF LUNG (CMS-HCC): Primary | ICD-10-CM

## 2024-12-04 PROCEDURE — 99441 PR PHYS/QHP TELEPHONE EVALUATION 5-10 MIN: CPT | Performed by: THORACIC SURGERY (CARDIOTHORACIC VASCULAR SURGERY)

## 2024-12-04 PROCEDURE — 4010F ACE/ARB THERAPY RXD/TAKEN: CPT | Performed by: THORACIC SURGERY (CARDIOTHORACIC VASCULAR SURGERY)

## 2024-12-04 ASSESSMENT — ENCOUNTER SYMPTOMS
LOSS OF SENSATION IN FEET: 0
OCCASIONAL FEELINGS OF UNSTEADINESS: 1
DEPRESSION: 0

## 2024-12-04 ASSESSMENT — PAIN SCALES - GENERAL: PAINLEVEL_OUTOF10: 0-NO PAIN

## 2024-12-26 DIAGNOSIS — D3A.090 CARCINOID TUMOR DETERMINED BY BIOPSY OF LUNG (CMS-HCC): Primary | ICD-10-CM

## 2025-05-08 NOTE — PROGRESS NOTES
Subjective:  feeling fine, wants to go home    Review of Systems:   Constitutional:  Denies headache, fatigue, weight loss, weight gain, fevers, chills.  HEENT:  Denies vision changes, dry eye, or congestion.   Respiratory: (+) Exertional dyspnea. Denies shortness of breath at rest, denies cough, denies wheezing.  Cardiovascular:  Denies swelling, chest pain, palpitations, dizziness, and syncope.  Gastrointestinal:  Denies nausea, vomiting, diarrhea. Denies early satiety.   Genitourinary:  Denies dysuria.  Musculoskeletal:  Denies pain.        Objective: 5/8/25: 96% on 6lpm NC, Cr 1.33, off diuretics    5/7/25: 98% on 6lpm NC, CR 1.37, diuretic held    5/6/25: Resumed 6lpm NC, Cr 1.42, given lasix 40mg IVP this AM, net neg 990mL/24 hours, NTproBNP 2K      VITAL SIGNS:     VITAL SIGNS:  Vital Last Value 24 Hour Range   Temperature 97.5 °F (36.4 °C) (05/08/25 1150) Temp  Min: 97.5 °F (36.4 °C)  Max: 98.2 °F (36.8 °C)   Pulse 84 (05/08/25 1150) Pulse  Min: 73  Max: 111   Respiratory 16 (05/08/25 1150) Resp  Min: 16  Max: 26   Non-Invasive  Blood Pressure 128/81 (05/08/25 1150) BP  Min: 92/56  Max: 139/81   Pulse Oximetry 97 % (05/08/25 1150) SpO2  Min: 91 %  Max: 99 %     Vital Today Admitted   Weight 78.1 kg (172 lb 2.9 oz) (05/08/25 0318) Weight: 83.5 kg (184 lb) (05/05/25 0758)   Height N/A Height: 5' 8\" (172.7 cm) (05/05/25 0758)   BMI N/A BMI (Calculated): 27.98 (05/05/25 0758)     Intake/Output    Intake/Output Summary (Last 24 hours) at 5/8/2025 1256  Last data filed at 5/8/2025 0930  Gross per 24 hour   Intake 1400 ml   Output 0 ml   Net 1400 ml            PHYSICAL EXAM:  General:  Alert and oriented. In no apparent distress.  Eye:  Pupils are equal, round and reactive. Vision grossly normal.    Neck:  JVP: normal No lymphadenopathy.  Respiratory:  bibasilar crackles   Cardiovascular:  Normal rate, Regular rhythm. 3+ bilateral radial pulses, 2+ bilateral dorsalis pedis. no edema  Gastrointestinal:  Soft,  Pharmacy Medication History Review    Comfort Bliss is a 64 y.o. 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 Pharmacy reviewed the patient's [id number] medications and allergies for accuracy.    The list below reflects the updated PTA list. Comments regarding how patient may be taking medications differently can be found in the Admit Orders Activity  Prior to Admission Medications   Prescriptions Last Dose Informant   Januvia 100 mg tablet 5/2/2024 Self   Sig: Take 1 tablet (100 mg) by mouth once daily.   NON FORMULARY Past Week Self   Sig: Amantadine 8%, Baclofen 2%, Gabapentin 6%, Amitriptyline 4%, Bupivacaine 2%, Clonidine 0.2%, Nifedipine 2% compounded cream - apply to affected area of feet 2-3 times daily as directed   OneTouch Delica Plus Lancet 33 gauge misc  Self   Sig: use 1 LANCET to TEST BLOOD SUGAR twice a day   OneTouch Ultra Test strip  Self   Sig: use 1 TEST STRIP to TEST BLOOD SUGAR twice a day as directed   OneTouch Ultra2 Meter misc  Self   Sig: use as directed twice a day   albuterol 2.5 mg /3 mL (0.083 %) nebulizer solution Past Month Self   Sig: Take 3 mL (2.5 mg) by nebulization 4 times a day as needed for wheezing or shortness of breath.   chlorhexidine (Hibiclens) 4 % external liquid     Sig: Apply topically 2 times a day for 5 days.   chlorhexidine (Peridex) 0.12 % solution  Self   Sig: Swish and spit 15 mL night before surgery and morning of surgery   cholecalciferol (Vitamin D-3) 125 MCG (5000 UT) capsule 5/2/2024 Self   Sig: Take 1 capsule (125 mcg) by mouth once daily.   levothyroxine (Synthroid, Levoxyl) 25 mcg tablet 5/2/2024 Self   Sig: Take 1 tablet (25 mcg) by mouth once daily.   lisinopril 5 mg tablet 5/2/2024 Self   Sig: Take 1 tablet (5 mg) by mouth once daily.   metFORMIN  mg 24 hr tablet 5/2/2024 Self   Sig: Take 2 tablets (1,000 mg) by mouth once daily in the morning. And 1 tablet (500 mg) in the evening   omeprazole (PriLOSEC) 20 mg  DR capsule 5/2/2024 Self   Sig: Take 1 capsule (20 mg) by mouth 2 times a day.   oxybutynin XL (Ditropan-XL) 10 mg 24 hr tablet 5/2/2024 Self   Sig: Take 1 tablet (10 mg) by mouth once daily.   tiotropium-olodateroL (Stiolto Respimat) 2.5-2.5 mcg/actuation mist inhaler Past Week Self   Sig: Inhale 2 Inhalations once daily.      Facility-Administered Medications: None        The list below reflects the updated allergy list. Please review each documented allergy for additional clarification and justification.  Allergies  Reviewed by Elizabeth Espana PharmD on 5/3/2024        Severity Reactions Comments    Amoxicillin High Shortness of breath, Dizziness     Bee Venom Protein (honey Bee) High Swelling     Penicillins High Shortness of breath, Dizziness     Pravastatin Not Specified Other, Myalgia             Patient was unable to be assessed for M2B at discharge. Pharmacy has been updated to [facility]. M2B service not offered prior to surgery, please reassess prior to patient discharge if Meds to Beds is desired.     Sources used to complete the med history include: Patient interview - good historian of medications/ Pharmacy - [facility], [facility]/ Chart review/ [place]    Below are additional concerns with the patient's PTA list.  Patient requested [facility] in [place] as preferred pharmacy due to staying with her son after the procedure. Patient's home pharmacy is [facility] in [place].     Elizabeth Espana PharmD  Transitions of Care Pharmacist  [facility]  Please reach out via Secure Chat for questions, or if no response call [employer] or [facility]    Non-tender.     Integumentary:  Warm,  Intact. No rash, no cyanosis, no clubbing of fingernails.   Psychiatric:  Appropriate mood & affect.       ACTIVE MEDS:  Current Facility-Administered Medications   Medication Dose Route Frequency Provider Last Rate Last Admin    sodium chloride 0.9% infusion    Continuous PRN Miguelangel Shirley MD 10 mL/hr at 05/07/25 1147 10 mL/hr at 05/07/25 1147    acetaminophen (TYLENOL) tablet 650 mg  650 mg Oral Q4H PRN Miguelangel Shirley MD        Or    acetaminophen (TYLENOL) suppository 650 mg  650 mg Rectal Q4H PRN Miguelangel Shirley MD        sodium chloride 0.9 % injection 10 mL  10 mL Intravenous PRN Miguelangel Shirley MD        sodium chloride 0.9 % injection 2 mL  2 mL Intracatheter 2 times per day Miguelangel Shirley MD   2 mL at 05/08/25 0855    TREPROSTINIL 48 MCG IN POWD  48 mcg Inhalation 4x Daily AC & HS Dulce Nieves, CNP   48 mcg at 05/08/25 1253    [Held by provider] furosemide (LASIX INJECT) injection 40 mg  40 mg Intravenous Daily Esha Miles MD   40 mg at 05/06/25 0829    sodium chloride 0.9 % injection 10 mL  10 mL Intravenous PRN Esha Miles MD        sodium chloride 0.9 % injection 2 mL  2 mL Intracatheter 2 times per day Esha Miles MD   2 mL at 05/08/25 0855    Magnesium Standard Replacement Protocol   Does not apply See Admin Instructions Esha Miles MD        aluminum-magnesium hydroxide-simethicone (MAALOX) 200-200-20 MG/5ML suspension 30 mL  30 mL Oral Q4H PRN Esha Miles MD        Potassium Standard Replacement Protocol (Levels 3.5 and lower)   Does not apply See Admin Instructions Esha Miles MD        albuterol inhaler 2 puff  2 puff Inhalation Q4H Resp PRN Esha Miles MD        ondansetron (ZOFRAN ODT) disintegrating tablet 4 mg  4 mg Oral Q6H PRN Ali, Genoveva, DO        Or    ondansetron (ZOFRAN) injection 4 mg  4 mg Intravenous Q6H PRN Ali, Genoveva, DO   4 mg at 05/07/25 0347    polyethylene glycol (MIRALAX) packet 17 g  17 g Oral  Daily PRN Ali, Genoveva, DO        docusate sodium-sennosides (SENOKOT S) 50-8.6 MG 2 tablet  2 tablet Oral BID PRN Ali, Genoveva, DO        bisacodyl (DULCOLAX) suppository 10 mg  10 mg Rectal Daily PRN Ali, Genoveva, DO        melatonin tablet 6 mg  6 mg Oral Nightly PRN Ali, Genoveva, DO   6 mg at 05/06/25 0218    Phosphorus Standard Replacement Protocol   Does not apply See Admin Instructions Ali, Genoveva, DO        sodium chloride 0.9 % injection 10 mL  10 mL Intravenous PRN Ali, Genoveva, DO        sodium chloride 0.9 % injection 2 mL  2 mL Intracatheter 2 times per day Ali, Genoveva, DO   2 mL at 05/08/25 0855    enoxaparin (LOVENOX) injection 40 mg  40 mg Subcutaneous Nightly Dulce Nieves CNP   40 mg at 05/06/25 2015         Dosing Weight:  Dosing Weight: 80.2 kg (176 lb 12.9 oz) (5/6/2025 11:00 AM)       PULMONARY HYPERTENSION DIAGNOSTIC WORKUP AND RELEVANT HISTORY  David Gonzalez      1943      Dr. Shirley/ Dr. Richa DODSON 2/18/25 1. Left ventricle: The cavity size is normal. Wall thickness is normal. Systolic function is normal. The ejection fraction was measured by biplane method of disks. Grade I diastolic dysfunction. The ejection fraction is 60%. 2. Ventricular septum: There is diastolic flattening and systolic flattening. 3. Aortic valve: The annulus is normal-sized and moderately calcified. The valve is trileaflet. The leaflets are moderately sclerotic. 4. Right ventricle: The cavity size is moderately dilated. Systolic function is reduced.  10/17/24 1. Left ventricle: The cavity size is normal. Wall thickness is mildly increased. Systolic function is normal. The ejection fraction was measured by visual estimation. The ejection fraction is 60%. 2. Right ventricle: The cavity size is mildly dilated. Systolic function is mildly reduced.  1/29/24 1. Left ventricle: The cavity size is normal. Wall thickness is normal. Systolic function is normal. The ejection fraction was measured by single plane method of disks. The  ejection fraction is 63%. 2. Right ventricle: The cavity size is moderately dilated. Systolic function is normal. RVSP could not be calculated due to absence of tricuspid regurgitation. 3. Tricuspid valve: There is no regurgitation.  RHC 6/5/24 2lpm nasal cannula RA 13/9/8, RV 55/5/10, PA 56/20/35, PCWP 13/11/10, N[place]i CO 8/ CI 3.96, PVR 3.1wu, TD CO 5/ CI 2.45, PVR 5.1wu, PA 80%, Ao 99%   LHC   6MWT 1/29/25 108m, o2 sats 95%-77% 3lpm nasal cannula, HR , HRR1=17; (duration: 0:54 mm:ss). Rest. Pt Felt Heart Rate Increased; O2 Increase to 5L  V/Q   PFTS 2/1/24 FVC 2.48 (84%), FEV1 2.02 (91%), FEV1/FVC 0.81 (106%), TLC 71%, DLCO 27%  CTHR 10/17/24 1. Overall similar fibrotic changes of the lung with air trapping, probable UIP pattern (more than expected bilateral upper lobe involvement is atypical). 2. Enlarged main pulmonary artery, as can be seen with pulmonary hypertension. 3. Coronary artery calcification. Additional ancillary findings above.  10/26/23 Fibrotic changes consistent with UIP. Management consultation with pulmonary medicine. Enlargement pulmonary artery, may represent sequela of pulmonary hypertension. Mild cardiac prominence. Calcific atheromatous disease of left anterior descending.   CTPE   CT/PULM ANGIO  SLEEP 5/1/24 AHI 2.1, Upper Airway Resistance Syndrome, Patient snored % during the sleep. No Significant Obstructive Sleep apnea   CMRI   CPX    DRUG/TOXIN USE Y[_]/N[_] ______________  PULMONARY REHAB Completed Y[_]/N[_] Date: ____________  GENETIC TESTING REFERRAL    WHO GROUP 3 PH -ILD  PH Meds: Tyvaso DPI    Impression & Plan:      82 y/o F with PMH of iPF and PH presents to the hospital with c/o increased SOB and hyoxia     Pulmonary HTN  - Functional class III  - repeat RHC shows perstient PH, CO lower and therefore PVR is higher compared to 1 year ago  - repeat echo shows dilated RV with reduction in systolic function, LVEF preserved   - Etiology - primarily Group 3                -  ILD  - Echo: Feb 2025: RV moderately dilated with reduction in systolic function  PH therapies  Nitric oxide: n/a  ERA: n/a  Prostacyclin:  Tyvaso DPI 32mcg  (felt sick on higher doses)  ASI: n/a  Plan  Increase Tyvaso to goal dose 64mcg QID     Acute on Chronic Right Sided Heart Failure  - NYHA FC III, Stage C  - proBNP significantly more elevated   - home diuretic unclear: reportedly initially taking 1/2 tab of lasix but today reports she was not taking diuretic?  - received lasix IVP yesterday 5/5 and today 5/6  Plan  Hold diuretics     Acute on Chronic Hypoxic respiratory failure  - secondary to ILD -  concern for IPF/NSIP + COPD   - currently requiring HFNC  - on home oxygen - using 6-7pm NC   - previously on Ofev but stopped due to GI upset   - CT PE negative   Plan  Continue oxygen therapy  Pulmonology following        Plan Summary 05/08/25  Increase Tyvaso to 64mcg QID  Patient would like to discharge home. OK to discharge home from PH standpoint. [facility] aware that patient needs shipment of Tyvaso. She does have some drug on hand at home.     Thank you for the opportunity to participate in the care of this patient. Please contact me with any questions.    Dulce Nieves CNP      The Pulmonary Hypertension Service can be reached 24/7 via [employer]. Please page the on-call physician or nurse practitioner by searching 'pulmonary hypertension'.        Attending Addendum:      Patient seen and examined; case discussed with nurse practitioner.  I have repeated pertinent parts of the history and physical examination.  I have reviewed the patient's vitals, lab findings, and radiographic images.  The assessment and plan as noted have been formulated by me and discussed with the nurse practitioner.  I agree with the note as written by the nurse practitioner with the exceptions if any as noted below.  Visit Vitals  /81 (BP Location: LUE - Left upper extremity, Patient Position: Semi-Jean-Baptiste's)   Pulse 84   Temp  97.5 °F (36.4 °C) (Oral)   Resp 16   Ht 5' 8\" (1.727 m)   Wt 78.1 kg (172 lb 2.9 oz)   LMP  (LMP Unknown)   SpO2 97%   BMI 26.18 kg/m²        General:  Alert, cooperative, conversive. No acute distress.  Skin:  Warm and dry without rash.    Head:  Normocephalic-atraumatic.   Neck:  Trachea is midline. No adenopathy.  Normal thyroid without mass or tenderness..  Eyes:  Normal conjunctivae and sclerae.  Pupils equal, round, reactive to light.  Extraocular movements intact.    ENT:   Mucous membranes are moist.  Normal tympanic membranes and external auditory canals bilaterally.  No pharyngeal erythema or exudate.  No facial tenderness.  Normal nasal mucosa.  Cardiovascular:  Symmetrical pulses.  Regular rate and rhythm without murmur.  Respiratory:   Normal respiratory effort.  Clear to auscultation.  No wheezes, rales or rhonchi.  Gastrointestinal:  Soft and nontender.  Normal bowel sounds.  No hepatomegaly or splenomegaly.  No guarding or rebound tenderness.  No masses.  Neurologic:   Oriented times 4.    Assessment and plan:  Increase Tyvaso to 64 ucg QID  Discharge today.    Vazquez Saucedo M.D., M.S.  Clinical Professor, [facility]  Medical Director, Heart Failure, MCS and Heart Transplant  Available by Perfect [employer]  [address] 6[place] [address]  P   F   Ans Svc

## 2025-06-06 ENCOUNTER — HOSPITAL ENCOUNTER (OUTPATIENT)
Dept: RADIOLOGY | Facility: HOSPITAL | Age: 65
Discharge: HOME | End: 2025-06-06
Payer: COMMERCIAL

## 2025-06-06 DIAGNOSIS — D3A.090 CARCINOID TUMOR DETERMINED BY BIOPSY OF LUNG: ICD-10-CM

## 2025-06-06 PROCEDURE — 71250 CT THORAX DX C-: CPT

## 2025-06-10 ENCOUNTER — APPOINTMENT (OUTPATIENT)
Dept: SURGERY | Facility: CLINIC | Age: 65
End: 2025-06-10
Payer: COMMERCIAL

## 2025-06-17 ENCOUNTER — TELEMEDICINE (OUTPATIENT)
Dept: SURGERY | Facility: CLINIC | Age: 65
End: 2025-06-17
Payer: COMMERCIAL

## 2025-06-17 DIAGNOSIS — C34.31 MALIGNANT NEOPLASM OF LOWER LOBE OF RIGHT LUNG (MULTI): Primary | ICD-10-CM

## 2025-06-17 PROCEDURE — 99214 OFFICE O/P EST MOD 30 MIN: CPT | Performed by: PHYSICIAN ASSISTANT

## 2025-06-17 PROCEDURE — 4010F ACE/ARB THERAPY RXD/TAKEN: CPT | Performed by: PHYSICIAN ASSISTANT

## 2025-06-17 PROCEDURE — 1036F TOBACCO NON-USER: CPT | Performed by: PHYSICIAN ASSISTANT

## 2025-06-17 RX ORDER — FERROUS SULFATE 325(65) MG
325 TABLET ORAL 2 TIMES DAILY
COMMUNITY
Start: 2025-06-02

## 2025-06-17 RX ORDER — ESCITALOPRAM OXALATE 10 MG/1
10 TABLET ORAL
COMMUNITY

## 2025-06-17 RX ORDER — MIRABEGRON 50 MG/1
50 TABLET, FILM COATED, EXTENDED RELEASE ORAL DAILY
COMMUNITY

## 2025-06-17 SDOH — ECONOMIC STABILITY: FOOD INSECURITY: WITHIN THE PAST 12 MONTHS, THE FOOD YOU BOUGHT JUST DIDN'T LAST AND YOU DIDN'T HAVE MONEY TO GET MORE.: NEVER TRUE

## 2025-06-17 SDOH — ECONOMIC STABILITY: FOOD INSECURITY: WITHIN THE PAST 12 MONTHS, YOU WORRIED THAT YOUR FOOD WOULD RUN OUT BEFORE YOU GOT MONEY TO BUY MORE.: NEVER TRUE

## 2025-06-17 ASSESSMENT — LIFESTYLE VARIABLES
HOW OFTEN DO YOU HAVE SIX OR MORE DRINKS ON ONE OCCASION: NEVER
HOW OFTEN DO YOU HAVE A DRINK CONTAINING ALCOHOL: NEVER
SKIP TO QUESTIONS 9-10: 1
AUDIT-C TOTAL SCORE: 0
HOW MANY STANDARD DRINKS CONTAINING ALCOHOL DO YOU HAVE ON A TYPICAL DAY: PATIENT DOES NOT DRINK

## 2025-06-17 ASSESSMENT — PATIENT HEALTH QUESTIONNAIRE - PHQ9
SUM OF ALL RESPONSES TO PHQ9 QUESTIONS 1 AND 2: 0
1. LITTLE INTEREST OR PLEASURE IN DOING THINGS: NOT AT ALL
2. FEELING DOWN, DEPRESSED OR HOPELESS: NOT AT ALL

## 2025-06-17 ASSESSMENT — COLUMBIA-SUICIDE SEVERITY RATING SCALE - C-SSRS
2. HAVE YOU ACTUALLY HAD ANY THOUGHTS OF KILLING YOURSELF?: NO
1. IN THE PAST MONTH, HAVE YOU WISHED YOU WERE DEAD OR WISHED YOU COULD GO TO SLEEP AND NOT WAKE UP?: NO
6. HAVE YOU EVER DONE ANYTHING, STARTED TO DO ANYTHING, OR PREPARED TO DO ANYTHING TO END YOUR LIFE?: NO

## 2025-06-17 ASSESSMENT — ENCOUNTER SYMPTOMS
OCCASIONAL FEELINGS OF UNSTEADINESS: 1
LOSS OF SENSATION IN FEET: 0
DEPRESSION: 0

## 2025-06-17 ASSESSMENT — PAIN SCALES - GENERAL: PAINLEVEL_OUTOF10: 0-NO PAIN

## 2025-06-17 NOTE — PROGRESS NOTES
Subjective   Comfort Bliss  is a 65 y.o. female who presents for lung cancer surveillance   Audio-Video was not available for the patient, that this was an audio only visit. The patient is located in their home     Shue underwent a Right lower lobe carcinoid cancer status post robotic right lower lobe segmental resection on 5/3/24 with Dr Tate     This patient received radiographic imaging in the setting of COPD.  This identified a pulmonary nodule.  January 2024, a right lower lobe nodule grew from 12 x 9 mm to 17 x 10 mm.  She was referred for bronchoscopy procedure which was performed on 3/7/2024 and the right lower lobe biopsy was consistent with carcinoid tumor (Ki-67 proliferative index is low 2%). She received surgery on 5/3 and did well post-operatively. 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 She denies the following symptoms: chest pain, shortness of breath at rest, shortness of breath with activity, cough, hemoptysis, fevers, chills, and weight loss.      There have been no significant changes to their documented medical, surgical and family history.     12/14/2024: hospital admit to [facility]   4/7/2025: hospital admit for [facility]       She  reports that she quit smoking about 5 years ago. Her smoking use included cigarettes. She has never used smokeless tobacco. She reports that she does not currently use alcohol. She reports that she does not use drugs.    Objective   Physical Exam  Phone visit (audio only evaluation - patient declined request to have a video visit)   Diagnostic Studies  I reviewed a CT chest performed recently. I do not see any new or concerning nodules or adenopathy    === 06/06/25 ===    CT CHEST WO IV CONTRAST    - Impression -  1. Interval appearance of ill-defined area of ground-glass  airspace  disease in the left lower lobe which is nonspecific and may represent  inflammatory or infectious process. Recommend correlation with CT in  6 months.    2. Postsurgical changes the right lower lung without evidence of are  abnormal nodularity the surgical bed to suggest disease recurrence.  Small effusion and pleural thickening present. Short-term follow-up  in 6 months advised.  3. Multiple scattered nodules bilaterally measuring up 4 mm. These  are unchanged from the prior. Attention on follow-up study is advised    MACRO:  None    Signed by: Silas Rod 6/7/2025 12:11 PM  Dictation workstation:   [id number]      Assessment/Plan   I believe that the patient has no evidence of cancer recurrence.     Based on the patient's clinical presentation and my review of their radiographic imaging, I believe they have no evidence of cancer recurrence.  I recommend ongoing radiographic screening.    I recommend CT chest in 6 months  This will be done locally and follow up via phone call     I discussed this in detail with the patient, including a discussion of alternatives. They were comfortable with this approach.       Shaina Coles PA-C  Department of Thoracic and Esophageal Surgery  [facility]  [phone]  Time: I spent 15 minutes reviewing pertinent medical records including imaging and speaking with the patient     Virtual or Telephone Consent    While technically available, the patient was unable or unwilling to consent to connect via audio/video telehealth technology; therefore, I performed this visit using a real-time audio only connection between Comfort Bliss & Shaina Coles PA-C.  Verbal consent was requested and obtained from Comfort Bliss on this date, 06/17/25 for a telehealth visit and the patient's location was confirmed at the time of the visit.

## (undated) DEVICE — TROCAR, KII OPTICAL BLADELESS 5MM Z THREAD 100MM LNGTH

## (undated) DEVICE — RELOAD, SUREFORM 45, 4.3 GREEN

## (undated) DEVICE — TOWEL, SURGICAL, NEURO, O/R, 16 X 26, BLUE, STERILE

## (undated) DEVICE — GAUZE, KITTNER ROLL, STERILE

## (undated) DEVICE — GRASPER, LONG, BIPOLAR, DAVINCI XI

## (undated) DEVICE — FORCEPS, CADIERE, DAVINCI XI

## (undated) DEVICE — SUTURE, MONOCRYL, 4-0, 18 IN, PS2, UNDYED

## (undated) DEVICE — LOOP, VESSEL, MAXI, RED

## (undated) DEVICE — CATHETER TRAY, SURESTEP, 16FR, URINE METER W/STATLOCK

## (undated) DEVICE — MANIFOLD, 4 PORT NEPTUNE STANDARD

## (undated) DEVICE — KIT, ROBOTIC, CUSTOM UHC

## (undated) DEVICE — BAG, TISSUE RETRIEVAL, 10MM, ANCHOR

## (undated) DEVICE — ADHESIVE, SKIN, LIQUIBAND EXCEED

## (undated) DEVICE — TUBING SET, TRI-LUMEN, FILTERED, F/AIRSEAL

## (undated) DEVICE — OBTURATOR, BLADELESS , SU

## (undated) DEVICE — GRASPER, FENESTRATED TIP-UP XI

## (undated) DEVICE — CANNULA REDUCER, DAVINCI XI

## (undated) DEVICE — DRAPE, TIBURON, SPLIT SHEET, REINF ADH STRIP, 77X122

## (undated) DEVICE — DRESSING, ISLAND, TELFA, 4 X 5 IN

## (undated) DEVICE — TUBING, SUCTION, CONNECTING, STERILE 0.25 X 120 IN., LF

## (undated) DEVICE — SHEET, SPLIT, CARDIOVASCULAR TIBURON

## (undated) DEVICE — STAPLER, SUREFORM 45, CURVED TIP

## (undated) DEVICE — Device

## (undated) DEVICE — RELOAD, SUREFORM 45, 2.5 WHITE

## (undated) DEVICE — ELECTRODE, ELECTROSURGICAL, BLADE, INSULATED, ENT/IMA, STERILE

## (undated) DEVICE — DRAPE, ARM XI

## (undated) DEVICE — GOWN, ASTOUND, XL

## (undated) DEVICE — SPONGE, HEMOSTATIC, CELLULOSE, SURGICEL, 2 X 14 IN

## (undated) DEVICE — CATHETER, THORACIC, STRAIGHT, ADULT, 28 FR, PVC

## (undated) DEVICE — COLLECTION UNIT, DRAINAGE, THORACIC, SINGLE TUBE, DRY SUCTION, ATS COMPATIBLE, OASIS 3600, LF

## (undated) DEVICE — DISSECTOR, BLUNT TIP, 5MM

## (undated) DEVICE — ACCESS PORT, 12MM, 100MM LENGTH, LOW PROFILE W/BLADELESS OPTICAL TIP

## (undated) DEVICE — CANNULA SEAL, STAPLER, DAVINCI XI

## (undated) DEVICE — RELOAD, SUREFORM 45, 4.6 BLACK

## (undated) DEVICE — SUTURE, ETHIBOND, XTRA, 30 IN, 0, CT-1, GREEN

## (undated) DEVICE — COVER, CART, 45 X 27 X 48 IN, CLEAR

## (undated) DEVICE — SEAL, UNIVERSAL 5-8MM  XI

## (undated) DEVICE — DRAPE, COLUMN, DAVINCI XI

## (undated) DEVICE — DRAPE, INCISE, ANTIMICROBIAL, IOBAN 2, LARGE, 17 X 23 IN, DISPOSABLE, STERILE

## (undated) DEVICE — SUTURE, VICRYL, 2-0, 36 IN, CT-1, UNDYED